# Patient Record
Sex: FEMALE | Race: BLACK OR AFRICAN AMERICAN
[De-identification: names, ages, dates, MRNs, and addresses within clinical notes are randomized per-mention and may not be internally consistent; named-entity substitution may affect disease eponyms.]

---

## 2021-09-27 ENCOUNTER — HOSPITAL ENCOUNTER (EMERGENCY)
Dept: HOSPITAL 76 - ED | Age: 21
Discharge: HOME | End: 2021-09-27
Payer: COMMERCIAL

## 2021-09-27 VITALS — SYSTOLIC BLOOD PRESSURE: 119 MMHG | DIASTOLIC BLOOD PRESSURE: 75 MMHG

## 2021-09-27 DIAGNOSIS — J02.9: ICD-10-CM

## 2021-09-27 DIAGNOSIS — B37.3: Primary | ICD-10-CM

## 2021-09-27 LAB
BV DNA PNL VAG NAA+PROBE: NEGATIVE
C GLABRATA DNA BLD QL NAA+PROBE: NEGATIVE
C KRUSEI DNA VAG QL NAA+PROBE: NEGATIVE
C TRACH DNA SPEC NAA+PROBE-ACNC: NEGATIVE
CANDIDA DNA VAG QL NAA+PROBE: POSITIVE
N GONORRHOEA DNA GENITAL QL NAA+PROBE: NEGATIVE
T VAGINALIS RRNA GENITAL QL PROBE: NEGATIVE
T VAGINALIS RRNA GENITAL QL PROBE: NEGATIVE

## 2021-09-27 PROCEDURE — 87491 CHLMYD TRACH DNA AMP PROBE: CPT

## 2021-09-27 PROCEDURE — 87591 N.GONORRHOEAE DNA AMP PROB: CPT

## 2021-09-27 PROCEDURE — 87801 DETECT AGNT MULT DNA AMPLI: CPT

## 2021-09-27 PROCEDURE — 87070 CULTURE OTHR SPECIMN AEROBIC: CPT

## 2021-09-27 PROCEDURE — 87481 CANDIDA DNA AMP PROBE: CPT

## 2021-09-27 PROCEDURE — 87661 TRICHOMONAS VAGINALIS AMPLIF: CPT

## 2021-09-27 PROCEDURE — 87430 STREP A AG IA: CPT

## 2021-09-27 PROCEDURE — 99283 EMERGENCY DEPT VISIT LOW MDM: CPT

## 2021-09-27 NOTE — ED PHYSICIAN DOCUMENTATION
PD HPI URI





- Stated complaint


Stated Complaint: FEMALE ,SWOLLEN NECK GLANDS





- Chief complaint


Chief Complaint: General





- History obtained from


History obtained from: Patient





PD PAST MEDICAL HISTORY





- Present Medications


Home Medications: 


                                Ambulatory Orders











 Medication  Instructions  Recorded  Confirmed


 


Fluconazole [Diflucan] 150 mg PO Q3D 6 Days #2 tablet 09/27/21 


 


Ibuprofen [Motrin] 600 mg PO TID PRN #20 tab 09/27/21 


 


cephALEXin [Keflex] 500 mg PO TID 6 Days #18 cap 09/27/21 














- Allergies


Allergies/Adverse Reactions: 


                                    Allergies











Allergy/AdvReac Type Severity Reaction Status Date / Time


 


No Known Drug Allergies Allergy   Verified 09/27/21 01:29














PD ED PE NORMAL





- Vitals


Vital signs reviewed: Yes





- General


General: Alert and oriented X 3, No acute distress, Well developed/nourished





- HEENT


HEENT: Ears normal.  No: Pharynx benign (left tonsils with some enlargement, 

faint exudate in crypts, with some peritonsillar redness/mild edema. No apparent

 abscess appearance. )





- Neck


Neck: Supple, no meningeal sign, Other (left anterior cervical with enlarged 

lymph node that is tender. )





- Cardiac


Cardiac: RRR





- Respiratory


Respiratory: Clear bilaterally





- Female 


Female : Chaperone present, Other (redness of inner labial. Vault with thicker

 white discharge. No endocervical discharge. )





- Derm


Derm: Normal color, Warm and dry, No rash





Results





- Vitals


Vitals: 


                                     Oxygen











O2 Source                      Room air

















- Labs


Labs: 


                                  Microbiology











 09/27/21 01:32 Group A Strep Throat Culture - Preliminary





 Throat    CULTURE IN PROGRESS. RESULTS TO FOLLOW.








                                Laboratory Tests











  09/27/21 09/27/21 09/27/21





  01:32 02:03 02:03


 


C. glabrata (PCR)    NEGATIVE


 


C. krusei (PCR)    NEGATIVE


 


Candida species DNA    POSITIVE A


 


Chlam trachomat DNA PCR   NEGATIVE 


 


N.gonorrhoeae DNA (PCR)   NEGATIVE 


 


Group A Strep Rapid  Negative  


 


T. vaginalis (PCR)   NEGATIVE  NEGATIVE


 


Bact Vaginosis (PCR)    NEGATIVE














PD MEDICAL DECISION MAKING





- ED course


Complexity details: reviewed results, considered differential (vaginal exam c/w 

yeast vaginitis. Obtained PCR tests as well. Her tonsils seem some enlarged on 

left with some redness/mucosal swelling (but not appearing peritonsillar abscess

 extent) with neck adenopathy. Seems likely bacterial. ), d/w patient





Departure





- Departure


Disposition: 01 Home, Self Care


Clinical Impression: 


 Yeast vaginitis





Acute pharyngitis


Qualifiers:


 Pharyngitis/tonsillitis etiology: unspecified etiology Qualified Code(s): J02.9

 - Acute pharyngitis, unspecified





Condition: Stable


Record reviewed to determine appropriate education?: Yes


Instructions:  ED Strep Pharyngitis Poss, ED Vaginal Infec Fungal Kim


Follow-Up: 


WALTER MORIN ARNP [Primary Care Provider] - 


Prescriptions: 


Fluconazole [Diflucan] 150 mg PO Q3D 6 Days #2 tablet


cephALEXin [Keflex] 500 mg PO TID 6 Days #18 cap


Ibuprofen [Motrin] 600 mg PO TID PRN #20 tab


 PRN Reason: Pain


Comments: 


Your rapid strep test is negative but your symptoms are certainly suspicious for

 strep pharyngitis.  We can start treating empirically pending the throat 

culture which will result in a couple of days.  If the culture were to come back

 negative, then the antibiotics could be stopped with presumption of a viral 

illness instead.





The vaginal exam seems most consistent with yeast vaginitis and we can treat 

with Diflucan antifungal tablet the dose here today and then every 3 days for 2 

more doses.  That should allow for clearing of that.





The vaginal PCR tests will result in a day or so and tell us if there is any 

bacterial vaginitis or S TI's.  If those are positive then we would need to add 

antibiotics for those and we would give you a call.





I would anticipate improvement over the next few days.  Stay well-hydrated.  

Ibuprofen 3 times a day with food for pain and inflammation as needed.  Add 

Tylenol if needed for further pain.





Recheck if not improving well over the next several days.





I transmitted your prescriptions to [facility] in [place]. 


Discharge Date/Time: 09/27/21 02:25

## 2021-12-27 ENCOUNTER — HOSPITAL ENCOUNTER (OUTPATIENT)
Dept: HOSPITAL 76 - LAB.N | Age: 21
End: 2021-12-27
Attending: PHYSICIAN ASSISTANT
Payer: COMMERCIAL

## 2021-12-27 DIAGNOSIS — U07.1: Primary | ICD-10-CM

## 2021-12-27 LAB
CLARITY UR REFRACT.AUTO: CLEAR
GLUCOSE UR QL STRIP.AUTO: NEGATIVE MG/DL
KETONES UR QL STRIP.AUTO: NEGATIVE MG/DL
NITRITE UR QL STRIP.AUTO: NEGATIVE
PH UR STRIP.AUTO: 6 PH (ref 5–7.5)
PROT UR STRIP.AUTO-MCNC: NEGATIVE MG/DL
RBC # UR STRIP.AUTO: NEGATIVE /UL
RBC # URNS HPF: (no result) /HPF (ref 0–5)
SP GR UR STRIP.AUTO: 1.01 (ref 1–1.03)
SQUAMOUS URNS QL MICRO: (no result)
UROBILINOGEN UR QL STRIP.AUTO: (no result) E.U./DL
UROBILINOGEN UR STRIP.AUTO-MCNC: NEGATIVE MG/DL
WBC # UR MANUAL: (no result) /HPF (ref 0–5)

## 2021-12-27 PROCEDURE — 87275 INFLUENZA B AG IF: CPT

## 2021-12-27 PROCEDURE — 87086 URINE CULTURE/COLONY COUNT: CPT

## 2021-12-27 PROCEDURE — 81001 URINALYSIS AUTO W/SCOPE: CPT

## 2021-12-27 PROCEDURE — 87276 INFLUENZA A AG IF: CPT

## 2022-04-24 ENCOUNTER — HOSPITAL ENCOUNTER (EMERGENCY)
Dept: HOSPITAL 76 - ED | Age: 22
Discharge: HOME | End: 2022-04-24
Payer: COMMERCIAL

## 2022-04-24 VITALS — SYSTOLIC BLOOD PRESSURE: 128 MMHG | DIASTOLIC BLOOD PRESSURE: 72 MMHG

## 2022-04-24 DIAGNOSIS — R10.2: Primary | ICD-10-CM

## 2022-04-24 DIAGNOSIS — Z97.5: ICD-10-CM

## 2022-04-24 LAB
ALBUMIN DIAFP-MCNC: 3.7 G/DL (ref 3.2–5.5)
ALBUMIN/GLOB SERPL: 1 {RATIO} (ref 1–2.2)
ALP SERPL-CCNC: 31 IU/L (ref 42–121)
ALT SERPL W P-5'-P-CCNC: 20 IU/L (ref 10–60)
ANION GAP SERPL CALCULATED.4IONS-SCNC: 7 MMOL/L (ref 6–13)
AST SERPL W P-5'-P-CCNC: 22 IU/L (ref 10–42)
BASOPHILS NFR BLD AUTO: 0 10^3/UL (ref 0–0.1)
BASOPHILS NFR BLD AUTO: 0.2 %
BILIRUB BLD-MCNC: 0.4 MG/DL (ref 0.2–1)
BUN SERPL-MCNC: 12 MG/DL (ref 6–20)
CALCIUM UR-MCNC: 8.9 MG/DL (ref 8.5–10.3)
CHLORIDE SERPL-SCNC: 106 MMOL/L (ref 101–111)
CLARITY UR REFRACT.AUTO: (no result)
CO2 SERPL-SCNC: 26 MMOL/L (ref 21–32)
CREAT SERPLBLD-SCNC: 0.7 MG/DL (ref 0.4–1)
EOSINOPHIL # BLD AUTO: 0 10^3/UL (ref 0–0.7)
EOSINOPHIL NFR BLD AUTO: 0.3 %
ERYTHROCYTE [DISTWIDTH] IN BLOOD BY AUTOMATED COUNT: 12.9 % (ref 12–15)
GFRSERPLBLD MDRD-ARVRAT: 126 ML/MIN/{1.73_M2} (ref 89–?)
GLOBULIN SER-MCNC: 3.6 G/DL (ref 2.1–4.2)
GLUCOSE SERPL-MCNC: 88 MG/DL (ref 70–100)
GLUCOSE UR QL STRIP.AUTO: NEGATIVE MG/DL
HCG UR QL: NEGATIVE
HCT VFR BLD AUTO: 35.2 % (ref 37–47)
HGB UR QL STRIP: 11.9 G/DL (ref 12–16)
KETONES UR QL STRIP.AUTO: NEGATIVE MG/DL
LIPASE SERPL-CCNC: 25 U/L (ref 22–51)
LYMPHOCYTES # SPEC AUTO: 2.6 10^3/UL (ref 1.5–3.5)
LYMPHOCYTES NFR BLD AUTO: 23.1 %
MCH RBC QN AUTO: 30.3 PG (ref 27–31)
MCHC RBC AUTO-ENTMCNC: 33.8 G/DL (ref 32–36)
MCV RBC AUTO: 89.6 FL (ref 81–99)
MONOCYTES # BLD AUTO: 1 10^3/UL (ref 0–1)
MONOCYTES NFR BLD AUTO: 8.6 %
NEUTROPHILS # BLD AUTO: 7.5 10^3/UL (ref 1.5–6.6)
NEUTROPHILS # SNV AUTO: 11.1 X10^3/UL (ref 4.8–10.8)
NEUTROPHILS NFR BLD AUTO: 67.5 %
NITRITE UR QL STRIP.AUTO: NEGATIVE
NRBC # BLD AUTO: 0 /100WBC
NRBC # BLD AUTO: 0 X10^3/UL
PDW BLD AUTO: 10.8 FL (ref 7.9–10.8)
PH UR STRIP.AUTO: 7 PH (ref 5–7.5)
PLATELET # BLD: 229 10^3/UL (ref 130–450)
POTASSIUM SERPL-SCNC: 3.6 MMOL/L (ref 3.5–5)
PROT SPEC-MCNC: 7.3 G/DL (ref 6.7–8.2)
PROT UR STRIP.AUTO-MCNC: NEGATIVE MG/DL
RBC # UR STRIP.AUTO: (no result) /UL
RBC # URNS HPF: (no result) /HPF (ref 0–5)
RBC MAR: 3.93 10^6/UL (ref 4.2–5.4)
SODIUM SERPLBLD-SCNC: 139 MMOL/L (ref 135–145)
SP GR UR STRIP.AUTO: 1.02 (ref 1–1.03)
SQUAMOUS URNS QL MICRO: (no result)
UROBILINOGEN UR QL STRIP.AUTO: (no result) E.U./DL
UROBILINOGEN UR STRIP.AUTO-MCNC: NEGATIVE MG/DL
WBC # UR MANUAL: (no result) /HPF (ref 0–5)

## 2022-04-24 PROCEDURE — 93975 VASCULAR STUDY: CPT

## 2022-04-24 PROCEDURE — 85025 COMPLETE CBC W/AUTO DIFF WBC: CPT

## 2022-04-24 PROCEDURE — 76830 TRANSVAGINAL US NON-OB: CPT

## 2022-04-24 PROCEDURE — 99284 EMERGENCY DEPT VISIT MOD MDM: CPT

## 2022-04-24 PROCEDURE — 36415 COLL VENOUS BLD VENIPUNCTURE: CPT

## 2022-04-24 PROCEDURE — 99282 EMERGENCY DEPT VISIT SF MDM: CPT

## 2022-04-24 PROCEDURE — 76856 US EXAM PELVIC COMPLETE: CPT

## 2022-04-24 PROCEDURE — 80053 COMPREHEN METABOLIC PANEL: CPT

## 2022-04-24 PROCEDURE — 81001 URINALYSIS AUTO W/SCOPE: CPT

## 2022-04-24 PROCEDURE — 83690 ASSAY OF LIPASE: CPT

## 2022-04-24 PROCEDURE — 81003 URINALYSIS AUTO W/O SCOPE: CPT

## 2022-04-24 PROCEDURE — 81025 URINE PREGNANCY TEST: CPT

## 2022-04-24 PROCEDURE — 87086 URINE CULTURE/COLONY COUNT: CPT

## 2022-04-24 RX ADMIN — HYDROCODONE BITARTRATE AND ACETAMINOPHEN STA TAB: 5; 325 TABLET ORAL at 18:44

## 2022-04-24 NOTE — ED PHYSICIAN DOCUMENTATION
PD HPI ABD PAIN





- Stated complaint


Stated Complaint: ABD PX





- Chief complaint


Chief Complaint: Abd Pain





- History obtained from


History obtained from: Patient (21-year-old woman presents with pelvic pain that

is severe x2 days.  She thinks it is related to a 1 month old IUD.  She denies 

bleeding or discharge.  No urinary complaints.)





Review of Systems


Constitutional: denies: Fever, Chills


Cardiac: denies: Chest pain / pressure


Respiratory: denies: Dyspnea, Cough


GI: denies: Nausea, Vomiting, Diarrhea





PD PAST MEDICAL HISTORY





- Past Medical History


Past Medical History: No





- Past Surgical History


Past Surgical History: Yes


HEENT: Tonsil/Adenoidectomy





- Present Medications


Home Medications: 


                                Ambulatory Orders











 Medication  Instructions  Recorded  Confirmed


 


Fluconazole [Diflucan] 150 mg PO Q3D 6 Days #2 tablet 09/27/21 


 


Ibuprofen [Motrin] 600 mg PO TID PRN #20 tab 09/27/21 


 


cephALEXin [Keflex] 500 mg PO TID 6 Days #18 cap 09/27/21 














- Allergies


Allergies/Adverse Reactions: 


                                    Allergies











Allergy/AdvReac Type Severity Reaction Status Date / Time


 


No Known Drug Allergies Allergy   Verified 04/24/22 16:47














- Social History


Does the pt smoke?: No


Smoking Status: Never smoker


Does the pt drink ETOH?: Yes


Does the pt have substance abuse?: No





- Immunizations


Immunizations are current?: Yes





PD ED PE NORMAL





- Vitals


Vital signs reviewed: Yes





- General


General: Alert and oriented X 3, No acute distress





- Abdomen


Abdomen: Normal bowel sounds, Soft, Non tender





- Female 


Female : Chaperone present (Cassie NEVILLE), Other (Appropriate placement of IUD 

strings, IUD removed with patient request.  No bimanual tenderness.)





- Neuro


Neuro: Alert and oriented X 3, Normal speech





Results





- Vitals


Vitals: 


                               Vital Signs - 24 hr











  04/24/22 04/24/22





  16:44 16:47


 


Temperature 36.4 C L 36.5 C


 


Heart Rate 67 67


 


Respiratory 14 14





Rate  


 


Blood Pressure 132/75 H 132/75 H


 


O2 Saturation 100 100








                                     Oxygen











O2 Source                      Room air

















- Labs


Labs: 


                                Laboratory Tests











  04/24/22 04/24/22 04/24/22





  17:46 17:46 18:10


 


WBC  11.1 H  


 


RBC  3.93 L  


 


Hgb  11.9 L  


 


Hct  35.2 L  


 


MCV  89.6  


 


MCH  30.3  


 


MCHC  33.8  


 


RDW  12.9  


 


Plt Count  229  


 


MPV  10.8  


 


Neut # (Auto)  7.5 H  


 


Lymph # (Auto)  2.6  


 


Mono # (Auto)  1.0  


 


Eos # (Auto)  0.0  


 


Baso # (Auto)  0.0  


 


Absolute Nucleated RBC  0.00  


 


Nucleated RBC %  0.0  


 


Sodium   139 


 


Potassium   3.6 


 


Chloride   106 


 


Carbon Dioxide   26 


 


Anion Gap   7.0 


 


BUN   12 


 


Creatinine   0.7 


 


Estimated GFR (MDRD)   126 


 


Glucose   88 


 


Calcium   8.9 


 


Total Bilirubin   0.4 


 


AST   22 


 


ALT   20 


 


Alkaline Phosphatase   31 L 


 


Total Protein   7.3 


 


Albumin   3.7 


 


Globulin   3.6 


 


Albumin/Globulin Ratio   1.0 


 


Lipase   25 


 


Urine Color    YELLOW


 


Urine Clarity    HAZY


 


Urine pH    7.0


 


Ur Specific Gravity    1.020


 


Urine Protein    NEGATIVE


 


Urine Glucose (UA)    NEGATIVE


 


Urine Ketones    NEGATIVE


 


Urine Occult Blood    SMALL H


 


Urine Nitrite    NEGATIVE


 


Urine Bilirubin    NEGATIVE


 


Urine Urobilinogen    0.2 (NORMAL)


 


Ur Leukocyte Esterase    NEGATIVE


 


Urine RBC    6-10 H


 


Urine WBC    0-3


 


Ur Squamous Epith Cells    NONE SEEN


 


Urine Bacteria    None Seen


 


Ur Microscopic Review    INDICATED


 


Urine Culture Comments    NOT INDICATED


 


Urine HCG, Qual   














  04/24/22





  18:10


 


WBC 


 


RBC 


 


Hgb 


 


Hct 


 


MCV 


 


MCH 


 


MCHC 


 


RDW 


 


Plt Count 


 


MPV 


 


Neut # (Auto) 


 


Lymph # (Auto) 


 


Mono # (Auto) 


 


Eos # (Auto) 


 


Baso # (Auto) 


 


Absolute Nucleated RBC 


 


Nucleated RBC % 


 


Sodium 


 


Potassium 


 


Chloride 


 


Carbon Dioxide 


 


Anion Gap 


 


BUN 


 


Creatinine 


 


Estimated GFR (MDRD) 


 


Glucose 


 


Calcium 


 


Total Bilirubin 


 


AST 


 


ALT 


 


Alkaline Phosphatase 


 


Total Protein 


 


Albumin 


 


Globulin 


 


Albumin/Globulin Ratio 


 


Lipase 


 


Urine Color 


 


Urine Clarity 


 


Urine pH 


 


Ur Specific Gravity 


 


Urine Protein 


 


Urine Glucose (UA) 


 


Urine Ketones 


 


Urine Occult Blood 


 


Urine Nitrite 


 


Urine Bilirubin 


 


Urine Urobilinogen 


 


Ur Leukocyte Esterase 


 


Urine RBC 


 


Urine WBC 


 


Ur Squamous Epith Cells 


 


Urine Bacteria 


 


Ur Microscopic Review 


 


Urine Culture Comments 


 


Urine HCG, Qual  NEGATIVE














PD MEDICAL DECISION MAKING





- ED course


ED course: 





21-year-old woman presents with pelvic pain that she feels is related to her 

IUD.  Alternative etiologies were not identified on testing.  Per her request 

the IUD was removed in standard fashion with nurse Anabella martinez at the bedside and

 chaperoning and she was pleased.





Pelvic ultrasound was unremarkable with normal placement of the IUD.





Departure





- Departure


Disposition: 01 Home, Self Care


Clinical Impression: 


 Pelvic pain in female





Condition: Good


Record reviewed to determine appropriate education?: Yes


Instructions:  ED Pelvic Pain [facility]


Comments: 


Your IUD was removed today, you will need to use alternative forms of birth 

control.  Follow-up with your primary care or gynecologist, next available 

appointment.  Return for new or worsening symptoms.

## 2022-04-24 NOTE — ULTRASOUND REPORT
PROCEDURE:  Pelvic w/Transvag+Doppler Comp

 

INDICATIONS:  pelvic pain

 

TECHNIQUE:  

Real-time scanning was performed of the pelvic organs, with image documentation.  Additional endovagi
nal scanning was necessary due to incomplete visualization of the adnexal and endometrial structures 
by transabdominal scanning.  Doppler interrogation was performed of the ovaries bilaterally.

 

COMPARISON:  None.

 

FINDINGS:  

No pathologic free abdominal or pelvic fluid.  

 

Uterus:  Uterus is normal in size at 7.4 x 3.1 x 4.5 cm cm.  The endometrium measures 4.2 mm in combi
giorgio thickness.  IUD is in good position.

 

Ovaries:  Both ovaries have a normal size. Normal appearing arterial and venous waveforms are confirm
ed to each ovary.]

 

 

 

IMPRESSION: 

1. IUD is in good position.

2. No acute abnormality.

 

Reviewed by: Saran Boyle on 4/24/2022 7:35 PM PDT

Approved by: Saran Boyle on 4/24/2022 7:35 PM PDT

 

 

Station ID:  [id number]

## 2022-06-30 ENCOUNTER — HOSPITAL ENCOUNTER (EMERGENCY)
Dept: HOSPITAL 76 - ED | Age: 22
Discharge: HOME | End: 2022-06-30
Payer: COMMERCIAL

## 2022-06-30 VITALS — DIASTOLIC BLOOD PRESSURE: 82 MMHG | SYSTOLIC BLOOD PRESSURE: 123 MMHG

## 2022-06-30 DIAGNOSIS — S93.501A: Primary | ICD-10-CM
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
## 2022-06-30 PROCEDURE — 99283 EMERGENCY DEPT VISIT LOW MDM: CPT

## 2022-06-30 PROCEDURE — 73660 X-RAY EXAM OF TOE(S): CPT

## 2022-06-30 PROCEDURE — 99282 EMERGENCY DEPT VISIT SF MDM: CPT

## 2022-06-30 NOTE — ED PHYSICIAN DOCUMENTATION
PD HPI LOWER EXT INJURY





- Stated complaint


Stated Complaint: R TOE INJ





- Chief complaint


Chief Complaint: Ext Problem





- Additional information


Additional information: 


Patient is 21-year-old female presenting to the emergency department with right 

great toe pain.  Was doing reverse wall walks at the gym, flipped over backwards

and landed on her toe in an awkward fashion.  States that initially she was able

to ambulate however pain got progressively worse approximately 90 minutes after 

the incident.  Denies previous orthopedic injuries to the same foot.








Review of Systems


Ten Systems: 10 systems reviewed and negative


Constitutional: denies: Fever


Cardiac: denies: Chest pain / pressure


Respiratory: denies: Dyspnea


GI: denies: Abdominal Pain





PD PAST MEDICAL HISTORY





- Past Surgical History


Past Surgical History: Yes


HEENT: Tonsil/Adenoidectomy





- Present Medications


Home Medications: 


                                Ambulatory Orders











 Medication  Instructions  Recorded  Confirmed


 


Fluconazole [Diflucan] 150 mg PO Q3D 6 Days #2 tablet 09/27/21 


 


Ibuprofen [Motrin] 600 mg PO TID PRN #20 tab 09/27/21 


 


cephALEXin [Keflex] 500 mg PO TID 6 Days #18 cap 09/27/21 














- Allergies


Allergies/Adverse Reactions: 


                                    Allergies











Allergy/AdvReac Type Severity Reaction Status Date / Time


 


No Known Drug Allergies Allergy   Verified 06/30/22 20:40














- Social History


Does the pt smoke?: No


Smoking Status: Never smoker


Does the pt drink ETOH?: Yes


Does the pt have substance abuse?: No





- Immunizations


Immunizations are current?: Yes





PD ED PE NORMAL





- General


General: Alert and oriented X 3





- HEENT


HEENT: Atraumatic





- Respiratory


Respiratory: No respiratory distress





- Female 


Female : Deferred





- Rectal


Rectal: Deferred





- Extremities


Extremities: Other (There is ecchymosis on the dorsal aspect of the right great 

toe.  Normal sensation distal to site of injury.  Normal capillary refill.  

There is no pain at the navicular bone, base of the fifth metatarsal or at 

either the lateral or medial malleoli.)





Results





- Vitals


Vitals: 





                               Vital Signs - 24 hr











  06/30/22





  20:36


 


Temperature 36.5 C


 


Heart Rate 75


 


Respiratory 14





Rate 


 


Blood Pressure 123/82 H


 


O2 Saturation 100








                                     Oxygen











O2 Source                      Room air

















PD MEDICAL DECISION MAKING





- ED course


Complexity details: d/w patient


ED course: 


Patient is 21-year-old female presenting to the emergency department with right 

great toe injury.  X-rays negative for acute fracture.  Neurovascularly intact. 

 Will discharge with hard soled shoe, crutches.  Given Motrin here in the 

emergency department and instructions for alternating Motrin and Tylenol at 

home.  Encouraged ice packs and elevation as well.  Encouraged follow-up with 

primary care and or return to the emergency department for persistent or 

worsening symptoms.








Departure





- Departure


Clinical Impression: 


 Toe sprain





Instructions:  ED Sprain Foot

## 2022-06-30 NOTE — XRAY REPORT
PROCEDURE: Toe(s) RT 

 

INDICATIONS:  Toe pain, s/p fall

 

TECHNIQUE:   AP view of the foot and 2 additional views of the great toe acquired.

 

COMPARISON:  6/23/2022

 

FINDINGS:  

 

Bones:  No fractures or dislocations.  No suspicious bony lesions.  

 

Soft tissues:  No suspicious soft tissue densities.  

 

IMPRESSION:  

 

1. No fracture or dislocation.

 

Reviewed by: Manan Daniel MD on 6/30/2022 9:48 PM PDT

Approved by: Manan Daniel MD on 6/30/2022 9:48 PM PDT

 

 

Station ID:  [id number]

## 2023-02-23 ENCOUNTER — HOSPITAL ENCOUNTER (EMERGENCY)
Dept: HOSPITAL 76 - ED | Age: 23
Discharge: HOME | End: 2023-02-23
Payer: COMMERCIAL

## 2023-02-23 VITALS — SYSTOLIC BLOOD PRESSURE: 113 MMHG | DIASTOLIC BLOOD PRESSURE: 81 MMHG

## 2023-02-23 DIAGNOSIS — R10.30: Primary | ICD-10-CM

## 2023-02-23 LAB
ALBUMIN DIAFP-MCNC: 3.8 G/DL (ref 3.2–5.5)
ALBUMIN/GLOB SERPL: 0.9 {RATIO} (ref 1–2.2)
ALP SERPL-CCNC: 38 IU/L (ref 42–121)
ALT SERPL W P-5'-P-CCNC: 15 IU/L (ref 10–60)
ANION GAP SERPL CALCULATED.4IONS-SCNC: 9 MMOL/L (ref 6–13)
AST SERPL W P-5'-P-CCNC: 24 IU/L (ref 10–42)
BASOPHILS NFR BLD AUTO: 0 10^3/UL (ref 0–0.1)
BASOPHILS NFR BLD AUTO: 0.1 %
BILIRUB BLD-MCNC: 0.5 MG/DL (ref 0.2–1)
BUN SERPL-MCNC: 10 MG/DL (ref 6–20)
CALCIUM UR-MCNC: 9.2 MG/DL (ref 8.5–10.3)
CHLORIDE SERPL-SCNC: 102 MMOL/L (ref 101–111)
CLARITY UR REFRACT.AUTO: CLEAR
CO2 SERPL-SCNC: 23 MMOL/L (ref 21–32)
CREAT SERPLBLD-SCNC: 0.7 MG/DL (ref 0.4–1)
EOSINOPHIL # BLD AUTO: 0 10^3/UL (ref 0–0.7)
EOSINOPHIL NFR BLD AUTO: 0.3 %
ERYTHROCYTE [DISTWIDTH] IN BLOOD BY AUTOMATED COUNT: 13 % (ref 12–15)
GFRSERPLBLD MDRD-ARVRAT: 125 ML/MIN/{1.73_M2} (ref 89–?)
GLOBULIN SER-MCNC: 4.1 G/DL (ref 2.1–4.2)
GLUCOSE SERPL-MCNC: 109 MG/DL (ref 70–100)
GLUCOSE UR QL STRIP.AUTO: NEGATIVE MG/DL
HCG UR QL: NEGATIVE
HCT VFR BLD AUTO: 36.1 % (ref 37–47)
HGB UR QL STRIP: 12.3 G/DL (ref 12–16)
KETONES UR QL STRIP.AUTO: NEGATIVE MG/DL
LIPASE SERPL-CCNC: 28 U/L (ref 22–51)
LYMPHOCYTES # SPEC AUTO: 2.5 10^3/UL (ref 1.5–3.5)
LYMPHOCYTES NFR BLD AUTO: 28.5 %
MCH RBC QN AUTO: 29.7 PG (ref 27–31)
MCHC RBC AUTO-ENTMCNC: 34.1 G/DL (ref 32–36)
MCV RBC AUTO: 87.2 FL (ref 81–99)
MONOCYTES # BLD AUTO: 0.8 10^3/UL (ref 0–1)
MONOCYTES NFR BLD AUTO: 8.9 %
NEUTROPHILS # BLD AUTO: 5.4 10^3/UL (ref 1.5–6.6)
NEUTROPHILS # SNV AUTO: 8.7 X10^3/UL (ref 4.8–10.8)
NEUTROPHILS NFR BLD AUTO: 62.1 %
NITRITE UR QL STRIP.AUTO: NEGATIVE
NRBC # BLD AUTO: 0 /100WBC
NRBC # BLD AUTO: 0 X10^3/UL
PDW BLD AUTO: 10.5 FL (ref 7.9–10.8)
PH UR STRIP.AUTO: 6 PH (ref 5–7.5)
PLATELET # BLD: 264 10^3/UL (ref 130–450)
POTASSIUM SERPL-SCNC: 3.5 MMOL/L (ref 3.5–5)
PROT SPEC-MCNC: 7.9 G/DL (ref 6.7–8.2)
PROT UR STRIP.AUTO-MCNC: NEGATIVE MG/DL
RBC # UR STRIP.AUTO: (no result) /UL
RBC # URNS HPF: (no result) /HPF (ref 0–5)
RBC MAR: 4.14 10^6/UL (ref 4.2–5.4)
SODIUM SERPLBLD-SCNC: 134 MMOL/L (ref 135–145)
SP GR UR STRIP.AUTO: 1.02 (ref 1–1.03)
SQUAMOUS URNS QL MICRO: (no result)
UROBILINOGEN UR QL STRIP.AUTO: (no result) E.U./DL
UROBILINOGEN UR STRIP.AUTO-MCNC: NEGATIVE MG/DL
WBC # UR MANUAL: (no result) /HPF (ref 0–5)

## 2023-02-23 PROCEDURE — 87661 TRICHOMONAS VAGINALIS AMPLIF: CPT

## 2023-02-23 PROCEDURE — 74177 CT ABD & PELVIS W/CONTRAST: CPT

## 2023-02-23 PROCEDURE — 80053 COMPREHEN METABOLIC PANEL: CPT

## 2023-02-23 PROCEDURE — 36415 COLL VENOUS BLD VENIPUNCTURE: CPT

## 2023-02-23 PROCEDURE — 81025 URINE PREGNANCY TEST: CPT

## 2023-02-23 PROCEDURE — 81001 URINALYSIS AUTO W/SCOPE: CPT

## 2023-02-23 PROCEDURE — 87591 N.GONORRHOEAE DNA AMP PROB: CPT

## 2023-02-23 PROCEDURE — 81003 URINALYSIS AUTO W/O SCOPE: CPT

## 2023-02-23 PROCEDURE — 99284 EMERGENCY DEPT VISIT MOD MDM: CPT

## 2023-02-23 PROCEDURE — 83690 ASSAY OF LIPASE: CPT

## 2023-02-23 PROCEDURE — 96374 THER/PROPH/DIAG INJ IV PUSH: CPT

## 2023-02-23 PROCEDURE — 87086 URINE CULTURE/COLONY COUNT: CPT

## 2023-02-23 PROCEDURE — 85025 COMPLETE CBC W/AUTO DIFF WBC: CPT

## 2023-02-23 PROCEDURE — 87491 CHLMYD TRACH DNA AMP PROBE: CPT

## 2023-02-23 NOTE — EXTERNAL MEDICAL SUMMARY RPT
Continuity of Care Document

                          Created on:2023



Patient:Sean Sung

Sex:Female

:2000

External Reference #:[id number]





Demographics







                          Address                   [address] [place] [address]

 

                          Phone                     Unavailable

 

                          Preferred Language        English

 

                          Marital Status            Never 

 

                          Mosque Affiliation     Unknown

 

                          Race                      Unknown

 

                          Ethnic Group              Not  or 









Author







                          Organization              [facility]

 

                          [place] [address]

 

                          Phone                     [phone]









Care Team Providers







                    Name                Role                Phone

 

                    Paz Garcia     Unavailable         Unavailable









Allergies and Intolerances







                 date            description     facility        type

 

                 (no date)       No Known Drug Allergies  [facility]  (unkn

own)







Encounters

No information.



Functional Status

No information.



Immunizations

No information.



Medications

No information.



Problems







                     date                description         facility

 

                     2023 00:00    Abdominal pain      [facility]







Procedures

No information.



Results/Labs







           test      date      author    facility  value     unit      interpret

ation









                                         Result panel 1









           (unknown)  (no       (unknown)  (unknown)  (no value)  (units    (unk

nown)



                    date)                                   unknown)  

 

           (unknown)  (no       (unknown)  (unknown)  23  (units    (unkno

wn)



                    date)                                   unknown)  

 

           (unknown)  (no       (unknown)  (unknown)  04:20     (units    (unkno

wn)



                    date)                                   unknown)  

 

           (unknown)  (no       (unknown)  (unknown)  [id number]     (units    (unkno

wn)



                    date)                                   unknown)  

 

           (unknown)  (no       (unknown)  (unknown)  Age/Sex: 22 / F  (units   

 (unknown)



                    date)                                   unknown)  

 

           (unknown)  (no       (unknown)  (unknown)  Allergies  (units    (unkn

own)



                    date)                                   unknown)  

 

           (unknown)  (no       (unknown)  (unknown)  Allergy/AdvReac  (units   

 (unknown)



                    date)                         Type Severity unknown)  



                                                  Reaction Status           



                                                  Date / Time           

 

           (unknown)  (no       (unknown)  (unknown)  Bedside Urine  (units    (

unknown)



                    date)                         Bilirubin - unknown)  



                                                  Negative            

 

           (unknown)  (no       (unknown)  (unknown)  Bedside Urine  (units    (

unknown)



                    date)                         Glucose Negative unknown)  

 

           (unknown)  (no       (unknown)  (unknown)  Bedside Urine  (units    (

unknown)



                    date)                         Ketone -  unknown)  



                                                  Negative            

 

           (unknown)  (no       (unknown)  (unknown)  Bedside Urine  (units    (

unknown)



                    date)                         Leukocytes - unknown)  



                                                  Negative            

 

           (unknown)  (no       (unknown)  (unknown)  Bedside Urine  (units    (

unknown)



                    date)                         Nitrite - unknown)  



                                                  Negative            

 

           (unknown)  (no       (unknown)  (unknown)  Bedside Urine  (units    (

unknown)



                    date)                         Occult Blood unknown)  

 

           (unknown)  (no       (unknown)  (unknown)  Bedside Urine  (units    (

unknown)



                    date)                         Protein - unknown)  



                                                  Negative            

 

           (unknown)  (no       (unknown)  (unknown)  Bedside Urine  (units    (

unknown)



                    date)                         Urobilinogen - unknown)  



                                                  Negative            

 

           (unknown)  (no       (unknown)  (unknown)  Bedside Urine  (units    (

unknown)



                    date)                         pH 6.0    unknown)  

 

           (unknown)  (no       (unknown)  (unknown)  Blood Pressure  (units    

(unknown)



                    date)                         121/77 23 unknown)  



                                                  04:20               

 

           (unknown)  (no       (unknown)  (unknown)  Blood Pressure  (units    

(unknown)



                    date)                             unknown)  

 

           (unknown)  (no       (unknown)  (unknown)  Chief     (units    (unkno

wn)



                    date)                         Complaint: unknown)  



                                                  Abdominal Pain           

 

           (unknown)  (no       (unknown)  (unknown)  Course    (units    (unkno

wn)



                    date)                                   unknown)  

 

           (unknown)  (no       (unknown)  (unknown)  : 2000  (units   

 (unknown)



                    date)                         Acct:[id number] unknown)  

 

           (unknown)  (no       (unknown)  (unknown)  Date of   (units    (unkno

wn)



                    date)                         Service:  unknown)  



                                                  23            

 

           (unknown)  (no       (unknown)  (unknown)  Departure  (units    (unkn

own)



                    date)                                   unknown)  

 

           (unknown)  (no       (unknown)  (unknown)  Discharge Plan  (units    

(unknown)



                    date)                                   unknown)  

 

           (unknown)  (no       (unknown)  (unknown)  ER Physician:  (units    (

unknown)



                    date)                         Paz Garcia unknown)  



                                                  D.O.                

 

           (unknown)  (no       (unknown)  (unknown)  Emergency  (units    (unkn

own)



                    date)                         Report    unknown)  

 

           (unknown)  (no       (unknown)  (unknown)  Esterase  (units    (unkno

wn)



                    date)                                   unknown)  

 

           (unknown)  (no       (unknown)  (unknown)  Exam      (units    (unkno

wn)



                    date)                                   unknown)  

 

           (unknown)  (no       (unknown)  (unknown)  General   (units    (unkno

wn)



                    date)                                   unknown)  

 

           (unknown)  (no       (unknown)  (unknown)  HPI - Abdominal  (units   

 (unknown)



                    date)                         Pain      unknown)  

 

           (unknown)  (no       (unknown)  (unknown)  Initial Vital  (units    (

unknown)



                    date)                         Signs     unknown)  

 

           (unknown)  (no       (unknown)  (unknown)  Initial Vital  (units    (

unknown)



                    date)                         Signs:    unknown)  

 

           (unknown)  (no       (unknown)  (unknown)  [facility]  (units   

 (unknown)



                    date)                         [address] unknown)  



                                                  [place], [place]           



                                                  [address]               

 

           (unknown)  (no       (unknown)  (unknown)  Lab Data  (units    (unkno

wn)



                    date)                                   unknown)  

 

           (unknown)  (no       (unknown)  (unknown)  MDM - Abdominal  (units   

 (unknown)



                    date)                         Pain      unknown)  

 

           (unknown)  (no       (unknown)  (unknown)  Mode of   (units    (unkno

wn)



                    date)                         arrival:  unknown)  



                                                  Ambulatory           

 

           (unknown)  (no       (unknown)  (unknown)  No Known Drug  (units    (

unknown)



                    date)                         Allergies unknown)  



                                                  Allergy Verified           



                                                  23 04:35           

 

           (unknown)  (no       (unknown)  (unknown)  Oxygen Delivery  (units   

 (unknown)



                    date)                         Method 23 unknown)  



                                                  04:20               

 

           (unknown)  (no       (unknown)  (unknown)  Oxygen Delivery  (units   

 (unknown)



                    date)                         Method Room Air unknown)  

 

           (unknown)  (no       (unknown)  (unknown)  Patient History  (units   

 (unknown)



                    date)                                   unknown)  

 

           (unknown)  (no       (unknown)  (unknown)  Patient:  (units    (unkno

wn)



                    date)                         Sean Sung unknown)  



                                                  MR#:            

 

           (unknown)  (no       (unknown)  (unknown)  Point of Care  (units    (

unknown)



                    date)                         Testing   unknown)  

 

           (unknown)  (no       (unknown)  (unknown)  Point of care  (units    (

unknown)



                    date)                         testing:  unknown)  

 

           (unknown)  (no       (unknown)  (unknown)  Pregnancy Test  (units    

(unknown)



                    date)                         Results Negative unknown)  

 

           (unknown)  (no       (unknown)  (unknown)  Provider,Lucila  (units   

 (unknown)



                    date)                         y PRAKASH [Primary unknown)  



                                                  Care Provider]           

 

           (unknown)  (no       (unknown)  (unknown)  Pulse Oximetry  (units    

(unknown)



                    date)                         100 23 unknown)  



                                                  04:20               

 

           (unknown)  (no       (unknown)  (unknown)  Pulse Oximetry  (units    

(unknown)



                    date)                         100       unknown)  

 

           (unknown)  (no       (unknown)  (unknown)  Pulse Rate 68  (units    (

unknown)



                    date)                         23 04:20 unknown)  

 

           (unknown)  (no       (unknown)  (unknown)  Pulse Rate 68  (units    (

unknown)



                    date)                                   unknown)  

 

           (unknown)  (no       (unknown)  (unknown)  Referrals:  (units    (unk

nown)



                    date)                                   unknown)  

 

           (unknown)  (no       (unknown)  (unknown)  Related Data  (units    (u

nknown)



                    date)                                   unknown)  

 

           (unknown)  (no       (unknown)  (unknown)  Respiratory  (units    (un

known)



                    date)                         Rate 18 23 unknown)  



                                                  04:20               

 

           (unknown)  (no       (unknown)  (unknown)  Respiratory  (units    (un

known)



                    date)                         Rate 18   unknown)  

 

           (unknown)  (no       (unknown)  (unknown)  Signed By:  (units    (unk

nown)



                    date)                                   unknown)  

 

           (unknown)  (no       (unknown)  (unknown)  Smoking Status:  (units   

 (unknown)



                    date)                         Never smoker unknown)  

 

           (unknown)  (no       (unknown)  (unknown)  Social History  (units    

(unknown)



                    date)                                   unknown)  

 

           (unknown)  (no       (unknown)  (unknown)  Source: patient  (units   

 (unknown)



                    date)                                   unknown)  

 

           (unknown)  (no       (unknown)  (unknown)  Stated    (units    (unkno

wn)



                    date)                         Complaint: ABD unknown)  



                                                  PAIN                

 

           (unknown)  (no       (unknown)  (unknown)  Substance Use  (units    (

unknown)



                    date)                         Type: does not unknown)  



                                                  use                 

 

           (unknown)  (no       (unknown)  (unknown)  Temperature  (units    (un

known)



                    date)                         98.7 F 23 unknown)  



                                                  04:20               

 

           (unknown)  (no       (unknown)  (unknown)  Temperature  (units    (un

known)



                    date)                         98.7 F    unknown)  

 

           (unknown)  (no       (unknown)  (unknown)  Time Seen by  (units    (u

nknown)



                    date)                         Provider: unknown)  



                                                  23 05:06           

 

           (unknown)  (no       (unknown)  (unknown)  Urine Dip  (units    (unkn

own)



                    date)                                   unknown)  

 

           (unknown)  (no       (unknown)  (unknown)  Urine Specific  (units    

(unknown)



                    date)                         Gravity 1.020 unknown)  

 

           (unknown)  (no       (unknown)  (unknown)  Vital Signs - 8  (units   

 (unknown)



                    date)                         hr        unknown)  

 

           (unknown)  (no       (unknown)  (unknown)  Vital Signs  (units    (un

known)



                    date)                                   unknown)  

 

           (unknown)  (no       (unknown)  (unknown)  Vital signs:  (units    (u

nknown)



                    date)                                   unknown)  

 

           (unknown)  (no       (unknown)  (unknown)  alcohol intake  (units    

(unknown)



                    date)                         frequency: a few unknown)  



                                                  times a month           









                                         Result panel 2









           (unknown)  (no       (unknown)  (unknown)  (no value)  (units    (unk

nown)



                    date)                                   unknown)  

 

           (unknown)  (no       (unknown)  (unknown)  23  (units    (unkno

wn)



                    date)                                   unknown)  

 

           (unknown)  (no       (unknown)  (unknown)  04:20     (units    (unkno

wn)



                    date)                                   unknown)  

 

           (unknown)  (no       (unknown)  (unknown)  [address]     (units    (unkno

wn)



                    date)                                   unknown)  

 

           (unknown)  (no       (unknown)  (unknown)  Age/Sex: 22 / F  (units   

 (unknown)



                    date)                                   unknown)  

 

           (unknown)  (no       (unknown)  (unknown)  Allergies  (units    (unkn

own)



                    date)                                   unknown)  

 

           (unknown)  (no       (unknown)  (unknown)  Allergy/AdvReac  (units   

 (unknown)



                    date)                         Type Severity unknown)  



                                                  Reaction Status           



                                                  Date / Time           

 

           (unknown)  (no       (unknown)  (unknown)  Bedside Urine  (units    (

unknown)



                    date)                         Bilirubin - unknown)  



                                                  Negative            

 

           (unknown)  (no       (unknown)  (unknown)  Bedside Urine  (units    (

unknown)



                    date)                         Glucose Negative unknown)  

 

           (unknown)  (no       (unknown)  (unknown)  Bedside Urine  (units    (

unknown)



                    date)                         Ketone -  unknown)  



                                                  Negative            

 

           (unknown)  (no       (unknown)  (unknown)  Bedside Urine  (units    (

unknown)



                    date)                         Leukocytes - unknown)  



                                                  Negative            

 

           (unknown)  (no       (unknown)  (unknown)  Bedside Urine  (units    (

unknown)



                    date)                         Nitrite - unknown)  



                                                  Negative            

 

           (unknown)  (no       (unknown)  (unknown)  Bedside Urine  (units    (

unknown)



                    date)                         Occult Blood unknown)  

 

           (unknown)  (no       (unknown)  (unknown)  Bedside Urine  (units    (

unknown)



                    date)                         Protein - unknown)  



                                                  Negative            

 

           (unknown)  (no       (unknown)  (unknown)  Bedside Urine  (units    (

unknown)



                    date)                         Urobilinogen - unknown)  



                                                  Negative            

 

           (unknown)  (no       (unknown)  (unknown)  Bedside Urine  (units    (

unknown)



                    date)                         pH 6.0    unknown)  

 

           (unknown)  (no       (unknown)  (unknown)  Blood Pressure  (units    

(unknown)



                    date)                         121/77 23 unknown)  



                                                  04:20               

 

           (unknown)  (no       (unknown)  (unknown)  Blood Pressure  (units    

(unknown)



                    date)                             unknown)  

 

           (unknown)  (no       (unknown)  (unknown)  Chief     (units    (unkno

wn)



                    date)                         Complaint: unknown)  



                                                  Abdominal Pain           

 

           (unknown)  (no       (unknown)  (unknown)  Course    (units    (unkno

wn)



                    date)                                   unknown)  

 

           (unknown)  (no       (unknown)  (unknown)  : 2000  (units   

 (unknown)



                    date)                         Acct:[id number] unknown)  

 

           (unknown)  (no       (unknown)  (unknown)  Date of   (units    (unkno

wn)



                    date)                         Service:  unknown)  



                                                  23            

 

           (unknown)  (no       (unknown)  (unknown)  Departure  (units    (unkn

own)



                    date)                                   unknown)  

 

           (unknown)  (no       (unknown)  (unknown)  Discharge Plan  (units    

(unknown)



                    date)                                   unknown)  

 

           (unknown)  (no       (unknown)  (unknown)  ER Physician:  (units    (

unknown)



                    date)                         Paz Garcia unknown)  



                                                  D.O.                

 

           (unknown)  (no       (unknown)  (unknown)  Emergency  (units    (unkn

own)



                    date)                         Report    unknown)  

 

           (unknown)  (no       (unknown)  (unknown)  Esterase  (units    (unkno

wn)



                    date)                                   unknown)  

 

           (unknown)  (no       (unknown)  (unknown)  Exam      (units    (unkno

wn)



                    date)                                   unknown)  

 

           (unknown)  (no       (unknown)  (unknown)  General   (units    (unkno

wn)



                    date)                                   unknown)  

 

           (unknown)  (no       (unknown)  (unknown)  HPI - Abdominal  (units   

 (unknown)



                    date)                         Pain      unknown)  

 

           (unknown)  (no       (unknown)  (unknown)  HPI narrative:  (units    

(unknown)



                    date)                                   unknown)  

 

           (unknown)  (no       (unknown)  (unknown)  History of  (units    (unk

nown)



                    date)                         Present Illness unknown)  

 

           (unknown)  (no       (unknown)  (unknown)  Initial Vital  (units    (

unknown)



                    date)                         Signs     unknown)  

 

           (unknown)  (no       (unknown)  (unknown)  Initial Vital  (units    (

unknown)



                    date)                         Signs:    unknown)  

 

           (unknown)  (no       (unknown)  (unknown)  [facility]  (units   

 (unknown)



                    date)                         [address] unknown)  



                                                  [place], [place]           



                                                  [address]               

 

           (unknown)  (no       (unknown)  (unknown)  Lab Data  (units    (unkno

wn)



                    date)                                   unknown)  

 

           (unknown)  (no       (unknown)  (unknown)  MDM - Abdominal  (units   

 (unknown)



                    date)                         Pain      unknown)  

 

           (unknown)  (no       (unknown)  (unknown)  Mode of   (units    (unkno

wn)



                    date)                         arrival:  unknown)  



                                                  Ambulatory           

 

           (unknown)  (no       (unknown)  (unknown)  No Known Drug  (units    (

unknown)



                    date)                         Allergies unknown)  



                                                  Allergy Verified           



                                                  23 04:35           

 

           (unknown)  (no       (unknown)  (unknown)  Oxygen Delivery  (units   

 (unknown)



                    date)                         Method 23 unknown)  



                                                  04:20               

 

           (unknown)  (no       (unknown)  (unknown)  Oxygen Delivery  (units   

 (unknown)



                    date)                         Method Room Air unknown)  

 

           (unknown)  (no       (unknown)  (unknown)  Patient History  (units   

 (unknown)



                    date)                                   unknown)  

 

           (unknown)  (no       (unknown)  (unknown)  Patient is a  (units    (u

nknown)



                    date)                         healthy   unknown)  



                                                  22-year-old           



                                                  female who woke           



                                                  up at 2:45 a.m.           



                                                  within can           

 

           (unknown)  (no       (unknown)  (unknown)  Patient:  (units    (unkno

wn)



                    date)                         Sean Sung unknown)  



                                                  MR#:            

 

           (unknown)  (no       (unknown)  (unknown)  Point of Care  (units    (

unknown)



                    date)                         Testing   unknown)  

 

           (unknown)  (no       (unknown)  (unknown)  Point of care  (units    (

unknown)



                    date)                         testing:  unknown)  

 

           (unknown)  (no       (unknown)  (unknown)  Pregnancy Test  (units    

(unknown)



                    date)                         Results Negative unknown)  

 

           (unknown)  (no       (unknown)  (unknown)  Provider,Lucila  (units   

 (unknown)



                    date)                         y PRAKASH [Primary unknown)  



                                                  Care Provider]           

 

           (unknown)  (no       (unknown)  (unknown)  Pulse Oximetry  (units    

(unknown)



                    date)                         100 23 unknown)  



                                                  04:20               

 

           (unknown)  (no       (unknown)  (unknown)  Pulse Oximetry  (units    

(unknown)



                    date)                         100       unknown)  

 

           (unknown)  (no       (unknown)  (unknown)  Pulse Rate 68  (units    (

unknown)



                    date)                         23 04:20 unknown)  

 

           (unknown)  (no       (unknown)  (unknown)  Pulse Rate 68  (units    (

unknown)



                    date)                                   unknown)  

 

           (unknown)  (no       (unknown)  (unknown) ROS        (units    (unkno

wn)



                    date)                         Unobtainable: unknown)  



                                                  All systems           



                                                  reviewed + are           



                                                  unremarkable           



                                                  except as noted           



                                                  in HPI              

 

           (unknown)  (no       (unknown)  (unknown)  Referrals:  (units    (unk

nown)



                    date)                                   unknown)  

 

           (unknown)  (no       (unknown)  (unknown)  Related Data  (units    (u

nknown)



                    date)                                   unknown)  

 

           (unknown)  (no       (unknown)  (unknown)  Respiratory  (units    (un

known)



                    date)                         Rate 18 23 unknown)  



                                                  04:20               

 

           (unknown)  (no       (unknown)  (unknown)  Respiratory  (units    (un

known)



                    date)                         Rate 18   unknown)  

 

           (unknown)  (no       (unknown)  (unknown)  Review of  (units    (unkn

own)



                    date)                         Systems   unknown)  

 

           (unknown)  (no       (unknown)  (unknown)  Signed By:  (units    (unk

nown)



                    date)                                   unknown)  

 

           (unknown)  (no       (unknown)  (unknown)  Smoking Status:  (units   

 (unknown)



                    date)                         Never smoker unknown)  

 

           (unknown)  (no       (unknown)  (unknown)  Social History  (units    

(unknown)



                    date)                         (Reviewed unknown)  



                                                  23 @ 05:19           



                                                  by Paz Garcia DO)           

 

           (unknown)  (no       (unknown)  (unknown)  Source: patient  (units   

 (unknown)



                    date)                                   unknown)  

 

           (unknown)  (no       (unknown)  (unknown)  Stated    (units    (unkno

wn)



                    date)                         Complaint: ABD unknown)  



                                                  PAIN                

 

           (unknown)  (no       (unknown)  (unknown)  Substance Use  (units    (

unknown)



                    date)                         Type: does not unknown)  



                                                  use                 

 

           (unknown)  (no       (unknown)  (unknown)  Temperature  (units    (un

known)



                    date)                         98.7 F 23 unknown)  



                                                  04:20               

 

           (unknown)  (no       (unknown)  (unknown)  Temperature  (units    (un

known)



                    date)                         98.7 F    unknown)  

 

           (unknown)  (no       (unknown)  (unknown)  Time Seen by  (units    (u

nknown)



                    date)                         Provider: unknown)  



                                                  23 05:06           

 

           (unknown)  (no       (unknown)  (unknown)  Urine Dip  (units    (unkn

own)



                    date)                                   unknown)  

 

           (unknown)  (no       (unknown)  (unknown)  Urine Specific  (units    

(unknown)



                    date)                         Gravity 1.020 unknown)  

 

           (unknown)  (no       (unknown)  (unknown)  Vital Signs - 8  (units   

 (unknown)



                    date)                         hr        unknown)  

 

           (unknown)  (no       (unknown)  (unknown)  Vital Signs  (units    (un

known)



                    date)                                   unknown)  

 

           (unknown)  (no       (unknown)  (unknown)  Vital signs:  (units    (u

nknown)



                    date)                                   unknown)  

 

           (unknown)  (no       (unknown)  (unknown)  alcohol intake  (units    

(unknown)



                    date)                         frequency: a few unknown)  



                                                  times a month           

 

           (unknown)  (no       (unknown)  (unknown)  and below  (units    (unkn

own)



                    date)                                   unknown)  

 

           (unknown)  (no       (unknown)  (unknown)  completely  (units    (unk

nown)



                    date)                         resolved now. unknown)  

 

           (unknown)  (no       (unknown)  (unknown)  did not vomit.  (units    

(unknown)



                    date)                         He would a bowel unknown)  



                                                  movement. She           



                                                  had a normal day           



                                                  yesterday and           

 

           (unknown)  (no       (unknown)  (unknown)  it lasted for  (units    (

unknown)



                    date)                         30 minutes. unknown)  



                                                  During the time           



                                                  she felt            



                                                  nauseous and           



                                                  short of. She           

 

           (unknown)  (no       (unknown)  (unknown)  lower abdominal  (units   

 (unknown)



                    date)                         pain all across unknown)  



                                                  her lower           



                                                  abdomen. She           



                                                  took 600 mg of           



                                                  ibuprofen           

 

           (unknown)  (no       (unknown)  (unknown)  went to bed  (units    (un

known)



                    date)                         last night. No unknown)  



                                                  prior history of           



                                                  ovarian cyst.           



                                                  Her pain is           









                                         Result panel 3









           (unknown)  (no       (unknown)  (unknown)  (no value)  (units    (unk

nown)



                    date)                                   unknown)  

 

           (unknown)  (no       (unknown)  (unknown)  *Continue to  (units    (u

nknown)



                    date)                         take medications unknown)  



                                                  as directed           

 

           (unknown)  (no       (unknown)  (unknown)  *Follow up with  (units   

 (unknown)



                    date)                         your primary unknown)  



                                                  care provider in           



                                                  2-3 days or call           



                                                  [phone]           

 

           (unknown)  (no       (unknown)  (unknown)  *Return to ER  (units    (

unknown)



                    date)                         if you should unknown)  



                                                  have increasing           



                                                  pain nausea           



                                                  vomiting or any           



                                                  new,                

 

           (unknown)  (no       (unknown)  (unknown)  *What to do: At  (units   

 (unknown)



                    date)                         this time unknown)  



                                                  abdominal pain           



                                                  that resolved           



                                                  within 30           



                                                  minutes is           

 

           (unknown)  (no       (unknown)  (unknown)  *You have been  (units    

(unknown)



                    date)                         diagnosed with unknown)  



                                                  abdominal pain           

 

           (unknown)  (no       (unknown)  (unknown)  23  (units    (unkno

wn)



                    date)                                   unknown)  

 

           (unknown)  (no       (unknown)  (unknown)  04:20     (units    (unkno

wn)



                    date)                                   unknown)  

 

           (unknown)  (no       (unknown)  (unknown)  [id number]     (units    (unkno

wn)



                    date)                                   unknown)  

 

           (unknown)  (no       (unknown)  (unknown)  Abdominal pain  (units    

(unknown)



                    date)                                   unknown)  

 

           (unknown)  (no       (unknown)  (unknown)  Activity  (units    (unkno

wn)



                    date)                         Restrictions/Add unknown)  



                                                  itional             



                                                  Instructions:           

 

           (unknown)  (no       (unknown)  (unknown)  Age/Sex: 22 / F  (units   

 (unknown)



                    date)                                   unknown)  

 

           (unknown)  (no       (unknown)  (unknown)  Allergies  (units    (unkn

own)



                    date)                                   unknown)  

 

           (unknown)  (no       (unknown)  (unknown)  Allergy/AdvReac  (units   

 (unknown)



                    date)                         Type Severity unknown)  



                                                  Reaction Status           



                                                  Date / Time           

 

           (unknown)  (no       (unknown)  (unknown)  Bedside Urine  (units    (

unknown)



                    date)                         Bilirubin - unknown)  



                                                  Negative            

 

           (unknown)  (no       (unknown)  (unknown)  Bedside Urine  (units    (

unknown)



                    date)                         Glucose Negative unknown)  

 

           (unknown)  (no       (unknown)  (unknown)  Bedside Urine  (units    (

unknown)



                    date)                         Ketone -  unknown)  



                                                  Negative            

 

           (unknown)  (no       (unknown)  (unknown)  Bedside Urine  (units    (

unknown)



                    date)                         Leukocytes - unknown)  



                                                  Negative            

 

           (unknown)  (no       (unknown)  (unknown)  Bedside Urine  (units    (

unknown)



                    date)                         Nitrite - unknown)  



                                                  Negative            

 

           (unknown)  (no       (unknown)  (unknown)  Bedside Urine  (units    (

unknown)



                    date)                         Occult Blood unknown)  

 

           (unknown)  (no       (unknown)  (unknown)  Bedside Urine  (units    (

unknown)



                    date)                         Protein - unknown)  



                                                  Negative            

 

           (unknown)  (no       (unknown)  (unknown)  Bedside Urine  (units    (

unknown)



                    date)                         Urobilinogen - unknown)  



                                                  Negative            

 

           (unknown)  (no       (unknown)  (unknown)  Bedside Urine  (units    (

unknown)



                    date)                         pH 6.0    unknown)  

 

           (unknown)  (no       (unknown)  (unknown)  Blood Pressure  (units    

(unknown)



                    date)                         121/77 23 unknown)  



                                                  04:20               

 

           (unknown)  (no       (unknown)  (unknown)  Blood Pressure  (units    

(unknown)



                    date)                             unknown)  

 

           (unknown)  (no       (unknown)  (unknown)  Chief     (units    (unkno

wn)



                    date)                         Complaint: unknown)  



                                                  Abdominal Pain           

 

           (unknown)  (no       (unknown)  (unknown)  Clinical  (units    (unkno

wn)



                    date)                         Impression: unknown)  

 

           (unknown)  (no       (unknown)  (unknown)  Course    (units    (unkno

wn)



                    date)                                   unknown)  

 

           (unknown)  (no       (unknown)  (unknown)  : 2000  (units   

 (unknown)



                    date)                         Acct:[id number] unknown)  

 

           (unknown)  (no       (unknown)  (unknown)  Date of   (units    (unkno

wn)



                    date)                         Service:  unknown)  



                                                  23            

 

           (unknown)  (no       (unknown)  (unknown)  Departure  (units    (unkn

own)



                    date)                                   unknown)  

 

           (unknown)  (no       (unknown)  (unknown)  Discharge Plan  (units    

(unknown)



                    date)                                   unknown)  

 

           (unknown)  (no       (unknown)  (unknown)  ER Physician:  (units    (

unknown)



                    date)                         Paz Garcia unknown)  



                                                  D.O.                

 

           (unknown)  (no       (unknown)  (unknown)  Emergency  (units    (unkn

own)



                    date)                         Report    unknown)  

 

           (unknown)  (no       (unknown)  (unknown)  Esterase  (units    (unkno

wn)



                    date)                                   unknown)  

 

           (unknown)  (no       (unknown)  (unknown)  Exam      (units    (unkno

wn)



                    date)                                   unknown)  

 

           (unknown)  (no       (unknown)  (unknown)  General   (units    (unkno

wn)



                    date)                                   unknown)  

 

           (unknown)  (no       (unknown)  (unknown)  HPI - Abdominal  (units   

 (unknown)



                    date)                         Pain      unknown)  

 

           (unknown)  (no       (unknown)  (unknown)  HPI narrative:  (units    

(unknown)



                    date)                                   unknown)  

 

           (unknown)  (no       (unknown)  (unknown)  History of  (units    (unk

nown)



                    date)                         Present Illness unknown)  

 

           (unknown)  (no       (unknown)  (unknown)  Initial Vital  (units    (

unknown)



                    date)                         Signs     unknown)  

 

           (unknown)  (no       (unknown)  (unknown)  Initial Vital  (units    (

unknown)



                    date)                         Signs:    unknown)  

 

           (unknown)  (no       (unknown)  (unknown)  Instructions:  (units    (

unknown)



                    date)                         DI for Abdominal unknown)  



                                                  Pain-Adult           

 

           (unknown)  (no       (unknown)  (unknown)  [facility]  (units   

 (unknown)



                    date)                         [address] [place] [address] Street unknown)  



                                                  [place], [place]           



                                                  [id number]               

 

           (unknown)  (no       (unknown)  (unknown)  Lab Data  (units    (unkno

wn)



                    date)                                   unknown)  

 

           (unknown)  (no       (unknown)  (unknown)  MDM - Abdominal  (units   

 (unknown)



                    date)                         Pain      unknown)  

 

           (unknown)  (no       (unknown)  (unknown)  Mode of   (units    (unkno

wn)



                    date)                         arrival:  unknown)  



                                                  Ambulatory           

 

           (unknown)  (no       (unknown)  (unknown)  No Known Drug  (units    (

unknown)



                    date)                         Allergies unknown)  



                                                  Allergy Verified           



                                                  23 04:35           

 

           (unknown)  (no       (unknown)  (unknown)  Oxygen Delivery  (units   

 (unknown)



                    date)                         Method 23 unknown)  



                                                  04:20               

 

           (unknown)  (no       (unknown)  (unknown)  Oxygen Delivery  (units   

 (unknown)



                    date)                         Method Room Air unknown)  

 

           (unknown)  (no       (unknown)  (unknown)  Patient   (units    (unkno

wn)



                    date)                         Disposition: unknown)  



                                                  Home                

 

           (unknown)  (no       (unknown)  (unknown)  Patient History  (units   

 (unknown)



                    date)                                   unknown)  

 

           (unknown)  (no       (unknown)  (unknown)  Patient is a  (units    (u

nknown)



                    date)                         healthy   unknown)  



                                                  22-year-old           



                                                  female who woke           



                                                  up at 2:45 a.m.           



                                                  within can           

 

           (unknown)  (no       (unknown)  (unknown)  Patient:  (units    (unkno

wn)



                    date)                         Sean Sung unknown)  



                                                  MR#:            

 

           (unknown)  (no       (unknown)  (unknown)  Point of Care  (units    (

unknown)



                    date)                         Testing   unknown)  

 

           (unknown)  (no       (unknown)  (unknown)  Point of care  (units    (

unknown)



                    date)                         testing:  unknown)  

 

           (unknown)  (no       (unknown)  (unknown)  Pregnancy Test  (units    

(unknown)



                    date)                         Results Negative unknown)  

 

           (unknown)  (no       (unknown)  (unknown)  Provider,Maniidbe  (units   

 (unknown)



                    date)                         y PRAKASH [Primary unknown)  



                                                  Care Provider]           

 

           (unknown)  (no       (unknown)  (unknown)  Pulse Oximetry  (units    

(unknown)



                    date)                         100 23 unknown)  



                                                  04:20               

 

           (unknown)  (no       (unknown)  (unknown)  Pulse Oximetry  (units    

(unknown)



                    date)                         100       unknown)  

 

           (unknown)  (no       (unknown)  (unknown)  Pulse Rate 68  (units    (

unknown)



                    date)                         23 04:20 unknown)  

 

           (unknown)  (no       (unknown)  (unknown)  Pulse Rate 68  (units    (

unknown)



                    date)                                   unknown)  

 

           (unknown)  (no       (unknown)  (unknown) ROS        (units    (unkno

wn)



                    date)                         Unobtainable: unknown)  



                                                  All systems           



                                                  reviewed + are           



                                                  unremarkable           



                                                  except as noted           



                                                  in HPI              

 

           (unknown)  (no       (unknown)  (unknown)  Referrals:  (units    (unk

nown)



                    date)                                   unknown)  

 

           (unknown)  (no       (unknown)  (unknown)  Related Data  (units    (u

nknown)



                    date)                                   unknown)  

 

           (unknown)  (no       (unknown)  (unknown)  Respiratory  (units    (un

known)



                    date)                         Rate 18 23 unknown)  



                                                  04:20               

 

           (unknown)  (no       (unknown)  (unknown)  Respiratory  (units    (un

known)



                    date)                         Rate 18   unknown)  

 

           (unknown)  (no       (unknown)  (unknown)  Review of  (units    (unkn

own)



                    date)                         Systems   unknown)  

 

           (unknown)  (no       (unknown)  (unknown)  Signed By:  (units    (unk

nown)



                    date)                                   unknown)  

 

           (unknown)  (no       (unknown)  (unknown)  Smoking Status:  (units   

 (unknown)



                    date)                         Never smoker unknown)  

 

           (unknown)  (no       (unknown)  (unknown)  Social History  (units    

(unknown)



                    date)                         (Reviewed unknown)  



                                                  23 @ 05:19           



                                                  by Paz Garcia DO)           

 

           (unknown)  (no       (unknown)  (unknown)  Source: patient  (units   

 (unknown)



                    date)                                   unknown)  

 

           (unknown)  (no       (unknown)  (unknown)  Stand Alone  (units    (un

known)



                    date)                         Forms: Patient unknown)  



                                                  Portal/API           

 

           (unknown)  (no       (unknown)  (unknown)  Stated    (units    (unkno

wn)



                    date)                         Complaint: ABD unknown)  



                                                  PAIN                

 

           (unknown)  (no       (unknown)  (unknown)  Substance Use  (units    (

unknown)



                    date)                         Type: does not unknown)  



                                                  use                 

 

           (unknown)  (no       (unknown)  (unknown)  Temperature  (units    (un

known)



                    date)                         98.7 F 23 unknown)  



                                                  04:20               

 

           (unknown)  (no       (unknown)  (unknown)  Temperature  (units    (un

known)



                    date)                         98.7 F    unknown)  

 

           (unknown)  (no       (unknown)  (unknown)  Time Seen by  (units    (u

nknown)



                    date)                         Provider: unknown)  



                                                  23 05:06           

 

           (unknown)  (no       (unknown)  (unknown)  Urine Dip  (units    (unkn

own)



                    date)                                   unknown)  

 

           (unknown)  (no       (unknown)  (unknown)  Urine Specific  (units    

(unknown)



                    date)                         Gravity 1.020 unknown)  

 

           (unknown)  (no       (unknown)  (unknown)  Vital Signs - 8  (units   

 (unknown)



                    date)                         hr        unknown)  

 

           (unknown)  (no       (unknown)  (unknown)  Vital Signs  (units    (un

known)



                    date)                                   unknown)  

 

           (unknown)  (no       (unknown)  (unknown)  Vital signs:  (units    (u

nknown)



                    date)                                   unknown)  

 

           (unknown)  (no       (unknown)  (unknown)  abdominal pain  (units    

(unknown)



                    date)                         then please unknown)  



                                                  return to the           



                                                  emergency           



                                                  department           

 

           (unknown)  (no       (unknown)  (unknown)  alcohol intake  (units    

(unknown)



                    date)                         frequency: a few unknown)  



                                                  times a month           

 

           (unknown)  (no       (unknown)  (unknown)  and below  (units    (unkn

own)



                    date)                                   unknown)  

 

           (unknown)  (no       (unknown)  (unknown)  completely  (units    (unk

nown)



                    date)                         resolved now. unknown)  

 

           (unknown)  (no       (unknown)  (unknown)  did not vomit.  (units    

(unknown)



                    date)                         He would a bowel unknown)  



                                                  movement. She           



                                                  had a normal day           



                                                  yesterday and           

 

           (unknown)  (no       (unknown)  (unknown)  it lasted for  (units    (

unknown)



                    date)                         30 minutes. unknown)  



                                                  During the time           



                                                  she felt            



                                                  nauseous and           



                                                  short of. She           

 

           (unknown)  (no       (unknown)  (unknown)  lower abdominal  (units   

 (unknown)



                    date)                         pain all across unknown)  



                                                  her lower           



                                                  abdomen. She           



                                                  took 600 mg of           



                                                  ibuprofen           

 

           (unknown)  (no       (unknown)  (unknown)  overall   (units    (unkno

wn)



                    date)                         reassuring. unknown)  



                                                  However if you           



                                                  should have new           



                                                  worsening or           



                                                  persistent           

 

           (unknown)  (no       (unknown)  (unknown)  went to bed  (units    (un

known)



                    date)                         last night. No unknown)  



                                                  prior history of           



                                                  ovarian cyst.           



                                                  Her pain is           

 

           (unknown)  (no       (unknown)  (unknown)  worsening or  (units    (u

nknown)



                    date)                         concerning unknown)  



                                                  symptoms            









                                         Result panel 4









           (unknown)  (no       (unknown)  (unknown)  (no value)  (units    (unk

nown)



                    date)                                   unknown)  

 

           (unknown)  (no       (unknown)  (unknown)  *Continue to take  (units 

   (unknown)



                    date)                         medications as unknown)  



                                                  directed            

 

           (unknown)  (no       (unknown)  (unknown)  *Follow up with  (units   

 (unknown)



                    date)                         your primary care unknown)  



                                                  provider in 2-3           



                                                  days or call           



                                                  [phone]           

 

           (unknown)  (no       (unknown)  (unknown)  *Return to ER if  (units  

  (unknown)



                    date)                         you should have unknown)  



                                                  increasing pain           



                                                  nausea vomiting or           



                                                  any new,            

 

           (unknown)  (no       (unknown)  (unknown)  *What to do: At  (units   

 (unknown)



                    date)                         this time unknown)  



                                                  abdominal pain           



                                                  that resolved           



                                                  within 30 minutes           



                                                  is                  

 

           (unknown)  (no       (unknown)  (unknown)  *You have been  (units    

(unknown)



                    date)                         diagnosed with unknown)  



                                                  abdominal pain           

 

           (unknown)  (no       (unknown)  (unknown)  02/02/23  (units    (unkno

wn)



                    date)                                   unknown)  

 

           (unknown)  (no       (unknown)  (unknown)  04:20     (units    (unkno

wn)



                    date)                                   unknown)  

 

           (unknown)  (no       (unknown)  (unknown)  [id number]     (units    (unkno

wn)



                    date)                                   unknown)  

 

           (unknown)  (no       (unknown)  (unknown)  ABDOMEN: Soft,  (units    

(unknown)



                    date)                         nontender. unknown)  



                                                  Normoactive bowel           



                                                  sounds all 4           



                                                  quadrants. No           

 

           (unknown)  (no       (unknown)  (unknown)  Abdominal pain  (units    

(unknown)



                    date)                                   unknown)  

 

           (unknown)  (no       (unknown)  (unknown)  Activity  (units    (unkno

wn)



                    date)                         Restrictions/Addit unknown)  



                                                  ional               



                                                  Instructions:           

 

           (unknown)  (no       (unknown)  (unknown)  Age/Sex: 22 / F  (units   

 (unknown)



                    date)                                   unknown)  

 

           (unknown)  (no       (unknown)  (unknown)  Allergies  (units    (unkn

own)



                    date)                                   unknown)  

 

           (unknown)  (no       (unknown)  (unknown)  Allergy/AdvReac  (units   

 (unknown)



                    date)                         Type Severity unknown)  



                                                  Reaction Status           



                                                  Date / Time           

 

           (unknown)  (no       (unknown)  (unknown)  Bedside Urine  (units    (

unknown)



                    date)                         Bilirubin - unknown)  



                                                  Negative            

 

           (unknown)  (no       (unknown)  (unknown)  Bedside Urine  (units    (

unknown)



                    date)                         Glucose Negative unknown)  

 

           (unknown)  (no       (unknown)  (unknown)  Bedside Urine  (units    (

unknown)



                    date)                         Ketone - Negative unknown)  

 

           (unknown)  (no       (unknown)  (unknown)  Bedside Urine  (units    (

unknown)



                    date)                         Leukocytes - unknown)  



                                                  Negative            

 

           (unknown)  (no       (unknown)  (unknown)  Bedside Urine  (units    (

unknown)



                    date)                         Nitrite - Negative unknown)  

 

           (unknown)  (no       (unknown)  (unknown)  Bedside Urine  (units    (

unknown)



                    date)                         Occult Blood unknown)  

 

           (unknown)  (no       (unknown)  (unknown)  Bedside Urine  (units    (

unknown)



                    date)                         Protein - Negative unknown)  

 

           (unknown)  (no       (unknown)  (unknown)  Bedside Urine  (units    (

unknown)



                    date)                         Urobilinogen - unknown)  



                                                  Negative            

 

           (unknown)  (no       (unknown)  (unknown)  Bedside Urine pH  (units  

  (unknown)



                    date)                         6.0       unknown)  

 

           (unknown)  (no       (unknown)  (unknown)  Blood Pressure  (units    

(unknown)



                    date)                         121/77 23 unknown)  



                                                  04:20               

 

           (unknown)  (no       (unknown)  (unknown)  Blood Pressure  (units    

(unknown)



                    date)                         121/77    unknown)  

 

           (unknown)  (no       (unknown)  (unknown)  CARDIOVASCULAR:  (units   

 (unknown)



                    date)                         Regular rate and unknown)  



                                                  rhythm without           



                                                  murmurs, rubs or           



                                                  gallops.            

 

           (unknown)  (no       (unknown)  (unknown)  Chief Complaint:  (units  

  (unknown)



                    date)                         Abdominal Pain unknown)  

 

           (unknown)  (no       (unknown)  (unknown)  Clinical  (units    (unkno

wn)



                    date)                         Impression: unknown)  

 

           (unknown)  (no       (unknown)  (unknown)  Course    (units    (unkno

wn)



                    date)                                   unknown)  

 

           (unknown)  (no       (unknown)  (unknown)  : 2000  (units   

 (unknown)



                    date)                         Acct:[id number] unknown)  

 

           (unknown)  (no       (unknown)  (unknown)  Date of Service:  (units  

  (unknown)



                    date)                         23  unknown)  

 

           (unknown)  (no       (unknown)  (unknown)  Departure  (units    (unkn

own)



                    date)                                   unknown)  

 

           (unknown)  (no       (unknown)  (unknown)  Discharge Plan  (units    

(unknown)



                    date)                                   unknown)  

 

           (unknown)  (no       (unknown)  (unknown)  ER Physician:  (units    (

unknown)



                    date)                         Paz Garcia unknown)  



                                                  D.O.                

 

           (unknown)  (no       (unknown)  (unknown)  EXTREMITIES:  (units    (u

nknown)



                    date)                         Normal range of unknown)  



                                                  motion, no           



                                                  clubbing or edema.           



                                                  Neurovascularly           

 

           (unknown)  (no       (unknown)  (unknown)  Emergency Report  (units  

  (unknown)



                    date)                                   unknown)  

 

           (unknown)  (no       (unknown)  (unknown)  Esterase  (units    (unkno

wn)



                    date)                                   unknown)  

 

           (unknown)  (no       (unknown)  (unknown)  Exam      (units    (unkno

wn)



                    date)                                   unknown)  

 

           (unknown)  (no       (unknown)  (unknown)  GENERAL: Alert  (units    

(unknown)



                    date)                         well-appearing unknown)  



                                                  22-year-old female           



                                                  and in no acute           



                                                  distress.           

 

           (unknown)  (no       (unknown)  (unknown)  General   (units    (unkno

wn)



                    date)                                   unknown)  

 

           (unknown)  (no       (unknown)  (unknown)  HEENT: Head  (units    (un

known)



                    date)                         atraumatic,EOMI, unknown)  



                                                  pupils reactive,           



                                                  face symmetric,           



                                                  moist mucous           

 

           (unknown)  (no       (unknown)  (unknown)  HPI - Abdominal  (units   

 (unknown)



                    date)                         Pain      unknown)  

 

           (unknown)  (no       (unknown)  (unknown)  HPI narrative:  (units    

(unknown)



                    date)                                   unknown)  

 

           (unknown)  (no       (unknown)  (unknown)  History of  (units    (unk

nown)



                    date)                         Present Illness unknown)  

 

           (unknown)  (no       (unknown)  (unknown)  Initial Vital  (units    (

unknown)



                    date)                         Signs     unknown)  

 

           (unknown)  (no       (unknown)  (unknown)  Initial Vital  (units    (

unknown)



                    date)                         Signs:    unknown)  

 

           (unknown)  (no       (unknown)  (unknown)  Instructions: DI  (units  

  (unknown)



                    date)                         for Abdominal unknown)  



                                                  Pain-Adult           

 

           (unknown)  (no       (unknown)  (unknown)  [facility]  (units   

 (unknown)



                    date)                         [address] unknown)  



                                                  [place], [place]           



                                                  [id number]               

 

           (unknown)  (no       (unknown)  (unknown)  Lab Data  (units    (unkno

wn)



                    date)                                   unknown)  

 

           (unknown)  (no       (unknown)  (unknown)  MDM - Abdominal  (units   

 (unknown)



                    date)                         Pain      unknown)  

 

           (unknown)  (no       (unknown)  (unknown)  MDM Narrative  (units    (

unknown)



                    date)                                   unknown)  

 

           (unknown)  (no       (unknown)  (unknown)  Medical decision  (units  

  (unknown)



                    date)                         making narrative: unknown)  

 

           (unknown)  (no       (unknown)  (unknown)  Mode of arrival:  (units  

  (unknown)



                    date)                         Ambulatory unknown)  

 

           (unknown)  (no       (unknown)  (unknown)  NEUROLOGICAL:  (units    (

unknown)



                    date)                         Alert and oriented unknown)  



                                                  x4.                 

 

           (unknown)  (no       (unknown)  (unknown)  No Known Drug  (units    (

unknown)



                    date)                         Allergies Allergy unknown)  



                                                  Verified 23           



                                                  04:35               

 

           (unknown)  (no       (unknown)  (unknown)  Oxygen Delivery  (units   

 (unknown)



                    date)                         Method 23 unknown)  



                                                  04:20               

 

           (unknown)  (no       (unknown)  (unknown)  Oxygen Delivery  (units   

 (unknown)



                    date)                         Method Room Air unknown)  

 

           (unknown)  (no       (unknown)  (unknown)  Patient   (units    (unkno

wn)



                    date)                         Disposition: Home unknown)  

 

           (unknown)  (no       (unknown)  (unknown)  Patient History  (units   

 (unknown)



                    date)                                   unknown)  

 

           (unknown)  (no       (unknown)  (unknown)  Patient is a  (units    (u

nknown)



                    date)                         healthy   unknown)  



                                                  22-year-old female           



                                                  who woke up at           



                                                  2:45 a.m. within           



                                                  can                 

 

           (unknown)  (no       (unknown)  (unknown)  Patient is a  (units    (u

nknown)



                    date)                         healthy   unknown)  



                                                  well-appearing           



                                                  22-year-old female           



                                                  presenting today           



                                                  with                

 

           (unknown)  (no       (unknown)  (unknown)  Patient:  (units    (unkno

wn)



                    date)                         Sean Sung MR#: unknown)  



                                                                 

 

           (unknown)  (no       (unknown)  (unknown)  Point of Care  (units    (

unknown)



                    date)                         Testing   unknown)  

 

           (unknown)  (no       (unknown)  (unknown)  Point of care  (units    (

unknown)



                    date)                         testing:  unknown)  

 

           (unknown)  (no       (unknown)  (unknown)  Pregnancy Test  (units    

(unknown)



                    date)                         Results Negative unknown)  

 

           (unknown)  (no       (unknown)  (unknown)  Provider,Min  (units  

  (unknown)



                    date)                         PRAKASH [Primary Care unknown)  



                                                  Provider]           

 

           (unknown)  (no       (unknown)  (unknown)  Pulse Oximetry  (units    

(unknown)



                    date)                         100 23 04:20 unknown)  

 

           (unknown)  (no       (unknown)  (unknown)  Pulse Oximetry  (units    

(unknown)



                    date)                         100       unknown)  

 

           (unknown)  (no       (unknown)  (unknown)  Pulse Rate 68  (units    (

unknown)



                    date)                         23 04:20 unknown)  

 

           (unknown)  (no       (unknown)  (unknown)  Pulse Rate 68  (units    (

unknown)



                    date)                                   unknown)  

 

           (unknown)  (no       (unknown)  (unknown)  RESPIRATORY:  (units    (u

nknown)



                    date)                         Breath sounds unknown)  



                                                  equal bilaterally,           



                                                  no wheezes rales           



                                                  or rhonchi.           

 

           (unknown)  (no       (unknown)  (unknown) ROS Unobtainable:  (units  

  (unknown)



                    date)                         All systems unknown)  



                                                  reviewed + are           



                                                  unremarkable           



                                                  except as noted in           



                                                  HPI                 

 

           (unknown)  (no       (unknown)  (unknown)  Referrals:  (units    (unk

nown)



                    date)                                   unknown)  

 

           (unknown)  (no       (unknown)  (unknown)  Related Data  (units    (u

nknown)



                    date)                                   unknown)  

 

           (unknown)  (no       (unknown)  (unknown)  Respiratory Rate  (units  

  (unknown)



                    date)                         18 23 04:20 unknown)  

 

           (unknown)  (no       (unknown)  (unknown)  Respiratory Rate  (units  

  (unknown)



                    date)                         18        unknown)  

 

           (unknown)  (no       (unknown)  (unknown)  Review of Systems  (units 

   (unknown)



                    date)                                   unknown)  

 

           (unknown)  (no       (unknown)  (unknown)  SKIN: Warm, dry,  (units  

  (unknown)



                    date)                         no laceration, no unknown)  



                                                  petechiae, no           



                                                  rashes or lesions.           

 

           (unknown)  (no       (unknown)  (unknown)  She is no  (units    (unkn

own)



                    date)                         recurrence of unknown)  



                                                  pain. She was           



                                                  previously within           



                                                  normal limits.           

 

           (unknown)  (no       (unknown)  (unknown)  Signed By:  (units    (unk

nown)



                    date)                                   unknown)  

 

           (unknown)  (no       (unknown)  (unknown)  Smoking Status:  (units   

 (unknown)



                    date)                         Never smoker unknown)  

 

           (unknown)  (no       (unknown)  (unknown)  Social History  (units    

(unknown)



                    date)                         (Reviewed 23 unknown)  



                                                  @ 05:19 by Paz Garcia DO)           

 

           (unknown)  (no       (unknown)  (unknown)  Source: patient  (units   

 (unknown)



                    date)                                   unknown)  

 

           (unknown)  (no       (unknown)  (unknown)  Stand Alone  (units    (un

known)



                    date)                         Forms: Patient unknown)  



                                                  Portal/API, Work           



                                                  Release Note           

 

           (unknown)  (no       (unknown)  (unknown)  Stated Complaint:  (units 

   (unknown)



                    date)                         ABD PAIN  unknown)  

 

           (unknown)  (no       (unknown)  (unknown)  Substance Use  (units    (

unknown)



                    date)                         Type: does not use unknown)  

 

           (unknown)  (no       (unknown)  (unknown)  Temperature 98.7  (units  

  (unknown)



                    date)                         F 23 04:20 unknown)  

 

           (unknown)  (no       (unknown)  (unknown)  Temperature 98.7  (units  

  (unknown)



                    date)                         F         unknown)  

 

           (unknown)  (no       (unknown)  (unknown)  Time Seen by  (units    (u

nknown)



                    date)                         Provider: 23 unknown)  



                                                  05:06               

 

           (unknown)  (no       (unknown)  (unknown)  Urinalysis and  (units    

(unknown)



                    date)                         pregnancy are unknown)  



                                                  negative.           



                                                  Differential           



                                                  diagnosis includes           



                                                  ovarian             

 

           (unknown)  (no       (unknown)  (unknown)  Urine Dip  (units    (unkn

own)



                    date)                                   unknown)  

 

           (unknown)  (no       (unknown)  (unknown)  Urine Specific  (units    

(unknown)



                    date)                         Gravity 1.020 unknown)  

 

           (unknown)  (no       (unknown)  (unknown)  Vital Signs - 8  (units   

 (unknown)



                    date)                         hr        unknown)  

 

           (unknown)  (no       (unknown)  (unknown)  Vital Signs  (units    (un

known)



                    date)                                   unknown)  

 

           (unknown)  (no       (unknown)  (unknown)  Vital signs:  (units    (u

nknown)



                    date)                                   unknown)  

 

           (unknown)  (no       (unknown)  (unknown)  abdominal pain  (units    

(unknown)



                    date)                         then please return unknown)  



                                                  to the emergency           



                                                  department           

 

           (unknown)  (no       (unknown)  (unknown)  alcohol intake  (units    

(unknown)



                    date)                         frequency: a few unknown)  



                                                  times a month           

 

           (unknown)  (no       (unknown)  (unknown)  and below  (units    (unkn

own)



                    date)                                   unknown)  

 

           (unknown)  (no       (unknown)  (unknown)  any chest pain no  (units 

   (unknown)



                    date)                         cough no rash no unknown)  



                                                  other symptoms.           

 

           (unknown)  (no       (unknown)  (unknown)  cyst rupture,  (units    (

unknown)



                    date)                         ectopic pregnancy, unknown)  



                                                  appendicitis.           



                                                  However I think           



                                                  all of these are           

 

           (unknown)  (no       (unknown)  (unknown)  did not vomit.  (units    

(unknown)



                    date)                         She had a normal unknown)  



                                                  day yesterday and           



                                                  went to bed last           



                                                  night. No           

 

           (unknown)  (no       (unknown)  (unknown)  guarding or  (units    (un

known)



                    date)                         rebound.  unknown)  

 

           (unknown)  (no       (unknown)  (unknown)  imaging needed.  (units   

 (unknown)



                    date)                                   unknown)  

 

           (unknown)  (no       (unknown)  (unknown)  intact    (units    (unkno

wn)



                    date)                                   unknown)  

 

           (unknown)  (no       (unknown)  (unknown)  it lasted for 30  (units  

  (unknown)



                    date)                         minutes. During unknown)  



                                                  the time she felt           



                                                  nauseous and short           



                                                  of. She             

 

           (unknown)  (no       (unknown)  (unknown)  lower abdominal  (units   

 (unknown)



                    date)                         pain all across unknown)  



                                                  her lower abdomen.           



                                                  She took 600 mg of           



                                                  ibuprofen           

 

           (unknown)  (no       (unknown)  (unknown)  membranes  (units    (unkn

own)



                    date)                                   unknown)  

 

           (unknown)  (no       (unknown)  (unknown)  overall   (units    (unkno

wn)



                    date)                         reassuring. unknown)  



                                                  However if you           



                                                  should have new           



                                                  worsening or           



                                                  persistent           

 

           (unknown)  (no       (unknown)  (unknown)  patient and  (units    (un

known)



                    date)                         partner. At this unknown)  



                                                  time I feel that           



                                                  there is no           



                                                  further testing or           

 

           (unknown)  (no       (unknown)  (unknown)  prior history of  (units  

  (unknown)



                    date)                         ovarian cyst. Her unknown)  



                                                  pain is completely           



                                                  resolved now. She           



                                                  denies              

 

           (unknown)  (no       (unknown)  (unknown)  relatively  (units    (unk

nown)



                    date)                         unlikely with how unknown)  



                                                  quickly symptoms           



                                                  resolved. I           



                                                  discussed this           



                                                  with                

 

           (unknown)  (no       (unknown)  (unknown)  sudden onset of  (units   

 (unknown)



                    date)                         severe abdominal unknown)  



                                                  pain lasting 30           



                                                  minutes resolving           



                                                  with Motrin.           

 

           (unknown)  (no       (unknown)  (unknown)  worsening or  (units    (u

nknown)



                    date)                         concerning unknown)  



                                                  symptoms            









                                         Result panel 5









           (unknown)  (no       (unknown)  (unknown)  (no value)  (units    (unk

nown)



                    date)                                   unknown)  

 

           (unknown)  (no       (unknown)  (unknown)  *Continue to take  (units 

   (unknown)



                    date)                         medications as unknown)  



                                                  directed            

 

           (unknown)  (no       (unknown)  (unknown)  *Follow up with  (units   

 (unknown)



                    date)                         your primary care unknown)  



                                                  provider in 2-3           



                                                  days or call           



                                                  [phone]           

 

           (unknown)  (no       (unknown)  (unknown)  *Return to ER if  (units  

  (unknown)



                    date)                         you should have unknown)  



                                                  increasing pain           



                                                  nausea vomiting or           



                                                  any new,            

 

           (unknown)  (no       (unknown)  (unknown)  *What to do: At  (units   

 (unknown)



                    date)                         this time unknown)  



                                                  abdominal pain           



                                                  that resolved           



                                                  within 30 minutes           



                                                  is                  

 

           (unknown)  (no       (unknown)  (unknown)  *You have been  (units    

(unknown)



                    date)                         diagnosed with unknown)  



                                                  abdominal pain           

 

           (unknown)  (no       (unknown)  (unknown)  23  (units    (unkno

wn)



                    date)                                   unknown)  

 

           (unknown)  (no       (unknown)  (unknown)  04:20     (units    (unkno

wn)



                    date)                                   unknown)  

 

           (unknown)  (no       (unknown)  (unknown)  24312     (units    (unkno

wn)



                    date)                                   unknown)  

 

           (unknown)  (no       (unknown)  (unknown)  ABDOMEN: Soft,  (units    

(unknown)



                    date)                         nontender. unknown)  



                                                  Normoactive bowel           



                                                  sounds all 4           



                                                  quadrants. No           

 

           (unknown)  (no       (unknown)  (unknown)  Abdominal pain  (units    

(unknown)



                    date)                                   unknown)  

 

           (unknown)  (no       (unknown)  (unknown)  Activity  (units    (unkno

wn)



                    date)                         Restrictions/Addit unknown)  



                                                  ional               



                                                  Instructions:           

 

           (unknown)  (no       (unknown)  (unknown)  Age/Sex: 22 / F  (units   

 (unknown)



                    date)                                   unknown)  

 

           (unknown)  (no       (unknown)  (unknown)  Allergies  (units    (unkn

own)



                    date)                                   unknown)  

 

           (unknown)  (no       (unknown)  (unknown)  Allergy/AdvReac  (units   

 (unknown)



                    date)                         Type Severity unknown)  



                                                  Reaction Status           



                                                  Date / Time           

 

           (unknown)  (no       (unknown)  (unknown)  Bedside Urine  (units    (

unknown)



                    date)                         Bilirubin - unknown)  



                                                  Negative            

 

           (unknown)  (no       (unknown)  (unknown)  Bedside Urine  (units    (

unknown)



                    date)                         Glucose Negative unknown)  

 

           (unknown)  (no       (unknown)  (unknown)  Bedside Urine  (units    (

unknown)



                    date)                         Ketone - Negative unknown)  

 

           (unknown)  (no       (unknown)  (unknown)  Bedside Urine  (units    (

unknown)



                    date)                         Leukocytes - unknown)  



                                                  Negative            

 

           (unknown)  (no       (unknown)  (unknown)  Bedside Urine  (units    (

unknown)



                    date)                         Nitrite - Negative unknown)  

 

           (unknown)  (no       (unknown)  (unknown)  Bedside Urine  (units    (

unknown)



                    date)                         Occult Blood unknown)  

 

           (unknown)  (no       (unknown)  (unknown)  Bedside Urine  (units    (

unknown)



                    date)                         Protein - Negative unknown)  

 

           (unknown)  (no       (unknown)  (unknown)  Bedside Urine  (units    (

unknown)



                    date)                         Urobilinogen - unknown)  



                                                  Negative            

 

           (unknown)  (no       (unknown)  (unknown)  Bedside Urine pH  (units  

  (unknown)



                    date)                         6.0       unknown)  

 

           (unknown)  (no       (unknown)  (unknown)  Blood Pressure  (units    

(unknown)



                    date)                         121/77 23 unknown)  



                                                  04:20               

 

           (unknown)  (no       (unknown)  (unknown)  Blood Pressure  (units    

(unknown)



                    date)                             unknown)  

 

           (unknown)  (no       (unknown)  (unknown)  CARDIOVASCULAR:  (units   

 (unknown)



                    date)                         Regular rate and unknown)  



                                                  rhythm without           



                                                  murmurs, rubs or           



                                                  gallops.            

 

           (unknown)  (no       (unknown)  (unknown)  Chief Complaint:  (units  

  (unknown)



                    date)                         Abdominal Pain unknown)  

 

           (unknown)  (no       (unknown)  (unknown)  Clinical  (units    (unkno

wn)



                    date)                         Impression: unknown)  

 

           (unknown)  (no       (unknown)  (unknown)  Course    (units    (unkno

wn)



                    date)                                   unknown)  

 

           (unknown)  (no       (unknown)  (unknown)  : 2000  (units   

 (unknown)



                    date)                         Acct:[id number] unknown)  

 

           (unknown)  (no       (unknown)  (unknown)  Date of Service:  (units  

  (unknown)



                    date)                         23  unknown)  

 

           (unknown)  (no       (unknown)  (unknown)  Departure  (units    (unkn

own)



                    date)                                   unknown)  

 

           (unknown)  (no       (unknown)  (unknown)  Discharge Plan  (units    

(unknown)



                    date)                                   unknown)  

 

           (unknown)  (no       (unknown)  (unknown)  ER Physician:  (units    (

unknown)



                    date)                         Paz Garcia unknown)  



                                                  D.O.                

 

           (unknown)  (no       (unknown)  (unknown)  EXTREMITIES:  (units    (u

nknown)



                    date)                         Normal range of unknown)  



                                                  motion, no           



                                                  clubbing or edema.           



                                                  Neurovascularly           

 

           (unknown)  (no       (unknown)  (unknown)  Emergency Report  (units  

  (unknown)



                    date)                                   unknown)  

 

           (unknown)  (no       (unknown)  (unknown)  Esterase  (units    (unkno

wn)



                    date)                                   unknown)  

 

           (unknown)  (no       (unknown)  (unknown)  Exam      (units    (unkno

wn)



                    date)                                   unknown)  

 

           (unknown)  (no       (unknown)  (unknown)  GENERAL: Alert  (units    

(unknown)



                    date)                         well-appearing unknown)  



                                                  22-year-old female           



                                                  and in no acute           



                                                  distress.           

 

           (unknown)  (no       (unknown)  (unknown)  General   (units    (unkno

wn)



                    date)                                   unknown)  

 

           (unknown)  (no       (unknown)  (unknown)  HEENT: Head  (units    (un

known)



                    date)                         atraumatic,EOMI, unknown)  



                                                  pupils reactive,           



                                                  face symmetric,           



                                                  moist mucous           

 

           (unknown)  (no       (unknown)  (unknown)  HPI - Abdominal  (units   

 (unknown)



                    date)                         Pain      unknown)  

 

           (unknown)  (no       (unknown)  (unknown)  HPI narrative:  (units    

(unknown)



                    date)                                   unknown)  

 

           (unknown)  (no       (unknown)  (unknown)  History of  (units    (unk

nown)



                    date)                         Present Illness unknown)  

 

           (unknown)  (no       (unknown)  (unknown)  Initial Vital  (units    (

unknown)



                    date)                         Signs     unknown)  

 

           (unknown)  (no       (unknown)  (unknown)  Initial Vital  (units    (

unknown)



                    date)                         Signs:    unknown)  

 

           (unknown)  (no       (unknown)  (unknown)  Instructions: DI  (units  

  (unknown)



                    date)                         for Abdominal unknown)  



                                                  Pain-Adult           

 

           (unknown)  (no       (unknown)  (unknown)  [facility]  (units   

 (unknown)



                    date)                         [address] unknown)  



                                                  [place], [place]           



                                                  [id number]               

 

           (unknown)  (no       (unknown)  (unknown)  Lab Data  (units    (unkno

wn)



                    date)                                   unknown)  

 

           (unknown)  (no       (unknown)  (unknown)  MDM - Abdominal  (units   

 (unknown)



                    date)                         Pain      unknown)  

 

           (unknown)  (no       (unknown)  (unknown)  MDM Narrative  (units    (

unknown)



                    date)                                   unknown)  

 

           (unknown)  (no       (unknown)  (unknown)  Medical decision  (units  

  (unknown)



                    date)                         making narrative: unknown)  

 

           (unknown)  (no       (unknown)  (unknown)  Mode of arrival:  (units  

  (unknown)



                    date)                         Ambulatory unknown)  

 

           (unknown)  (no       (unknown)  (unknown)  NEUROLOGICAL:  (units    (

unknown)



                    date)                         Alert and oriented unknown)  



                                                  x4.                 

 

           (unknown)  (no       (unknown)  (unknown)  No Known Drug  (units    (

unknown)



                    date)                         Allergies Allergy unknown)  



                                                  Verified 23           



                                                  04:35               

 

           (unknown)  (no       (unknown)  (unknown)  Oxygen Delivery  (units   

 (unknown)



                    date)                         Method 23 unknown)  



                                                  04:20               

 

           (unknown)  (no       (unknown)  (unknown)  Oxygen Delivery  (units   

 (unknown)



                    date)                         Method Room Air unknown)  

 

           (unknown)  (no       (unknown)  (unknown)  Patient   (units    (unkno

wn)



                    date)                         Disposition: Home unknown)  

 

           (unknown)  (no       (unknown)  (unknown)  Patient History  (units   

 (unknown)



                    date)                                   unknown)  

 

           (unknown)  (no       (unknown)  (unknown)  Patient is a  (units    (u

nknown)



                    date)                         healthy   unknown)  



                                                  22-year-old female           



                                                  who woke up at           



                                                  2:45 a.m. within           



                                                  can                 

 

           (unknown)  (no       (unknown)  (unknown)  Patient is a  (units    (u

nknown)



                    date)                         healthy   unknown)  



                                                  well-appearing           



                                                  22-year-old female           



                                                  presenting today           



                                                  with                

 

           (unknown)  (no       (unknown)  (unknown)  Patient:  (units    (unkno

wn)



                    date)                         Sean Sung MR#: unknown)  



                                                                 

 

           (unknown)  (no       (unknown)  (unknown)  Point of Care  (units    (

unknown)



                    date)                         Testing   unknown)  

 

           (unknown)  (no       (unknown)  (unknown)  Point of care  (units    (

unknown)



                    date)                         testing:  unknown)  

 

           (unknown)  (no       (unknown)  (unknown)  Pregnancy Test  (units    

(unknown)



                    date)                         Results Negative unknown)  

 

           (unknown)  (no       (unknown)  (unknown)  Provider,Min  (units  

  (unknown)



                    date)                         PRAKASH [[facility] unknown)  



                                                  Provider]           

 

           (unknown)  (no       (unknown)  (unknown)  Pulse Oximetry  (units    

(unknown)



                    date)                         100 23 04:20 unknown)  

 

           (unknown)  (no       (unknown)  (unknown)  Pulse Oximetry  (units    

(unknown)



                    date)                         100       unknown)  

 

           (unknown)  (no       (unknown)  (unknown)  Pulse Rate 68  (units    (

unknown)



                    date)                         23 04:20 unknown)  

 

           (unknown)  (no       (unknown)  (unknown)  Pulse Rate 68  (units    (

unknown)



                    date)                                   unknown)  

 

           (unknown)  (no       (unknown)  (unknown)  RESPIRATORY:  (units    (u

nknown)



                    date)                         Breath sounds unknown)  



                                                  equal bilaterally,           



                                                  no wheezes rales           



                                                  or rhonchi.           

 

           (unknown)  (no       (unknown)  (unknown) ROS Unobtainable:  (units  

  (unknown)



                    date)                         All systems unknown)  



                                                  reviewed + are           



                                                  unremarkable           



                                                  except as noted in           



                                                  HPI                 

 

           (unknown)  (no       (unknown)  (unknown)  Referrals:  (units    (unk

nown)



                    date)                                   unknown)  

 

           (unknown)  (no       (unknown)  (unknown)  Related Data  (units    (u

nknown)



                    date)                                   unknown)  

 

           (unknown)  (no       (unknown)  (unknown)  Respiratory Rate  (units  

  (unknown)



                    date)                         18 23 04:20 unknown)  

 

           (unknown)  (no       (unknown)  (unknown)  Respiratory Rate  (units  

  (unknown)



                    date)                         18        unknown)  

 

           (unknown)  (no       (unknown)  (unknown)  Review of Systems  (units 

   (unknown)



                    date)                                   unknown)  

 

           (unknown)  (no       (unknown)  (unknown)  SKIN: Warm, dry,  (units  

  (unknown)



                    date)                         no laceration, no unknown)  



                                                  petechiae, no           



                                                  rashes or lesions.           

 

           (unknown)  (no       (unknown)  (unknown)  She is no  (units    (unkn

own)



                    date)                         recurrence of unknown)  



                                                  pain. She was           



                                                  previously within           



                                                  normal limits.           

 

           (unknown)  (no       (unknown)  (unknown)  Signed By:  (units    (unk

nown)



                    date)                                   unknown)  

 

           (unknown)  (no       (unknown)  (unknown)  Smoking Status:  (units   

 (unknown)



                    date)                         Never smoker unknown)  

 

           (unknown)  (no       (unknown)  (unknown)  Social History  (units    

(unknown)



                    date)                         (Reviewed 23 unknown)  



                                                  @ 05:19 by Paz Garcia DO)           

 

           (unknown)  (no       (unknown)  (unknown)  Source: patient  (units   

 (unknown)



                    date)                                   unknown)  

 

           (unknown)  (no       (unknown)  (unknown)  Stand Alone  (units    (un

known)



                    date)                         Forms: Patient unknown)  



                                                  Portal/API, Work           



                                                  Release Note           

 

           (unknown)  (no       (unknown)  (unknown)  Stated Complaint:  (units 

   (unknown)



                    date)                         ABD PAIN  unknown)  

 

           (unknown)  (no       (unknown)  (unknown)  Substance Use  (units    (

unknown)



                    date)                         Type: does not use unknown)  

 

           (unknown)  (no       (unknown)  (unknown)  Temperature 98.7  (units  

  (unknown)



                    date)                         F 23 04:20 unknown)  

 

           (unknown)  (no       (unknown)  (unknown)  Temperature 98.7  (units  

  (unknown)



                    date)                         F         unknown)  

 

           (unknown)  (no       (unknown)  (unknown)  Time Seen by  (units    (u

nknown)



                    date)                         Provider: 23 unknown)  



                                                  05:06               

 

           (unknown)  (no       (unknown)  (unknown)  Urinalysis and  (units    

(unknown)



                    date)                         pregnancy are unknown)  



                                                  negative.           



                                                  Differential           



                                                  diagnosis includes           



                                                  ovarian             

 

           (unknown)  (no       (unknown)  (unknown)  Urine Dip  (units    (unkn

own)



                    date)                                   unknown)  

 

           (unknown)  (no       (unknown)  (unknown)  Urine Specific  (units    

(unknown)



                    date)                         Gravity 1.020 unknown)  

 

           (unknown)  (no       (unknown)  (unknown)  Vital Signs - 8  (units   

 (unknown)



                    date)                         hr        unknown)  

 

           (unknown)  (no       (unknown)  (unknown)  Vital Signs  (units    (un

known)



                    date)                                   unknown)  

 

           (unknown)  (no       (unknown)  (unknown)  Vital signs:  (units    (u

nknown)



                    date)                                   unknown)  

 

           (unknown)  (no       (unknown)  (unknown)  abdominal pain  (units    

(unknown)



                    date)                         then please return unknown)  



                                                  to the emergency           



                                                  department           

 

           (unknown)  (no       (unknown)  (unknown)  alcohol intake  (units    

(unknown)



                    date)                         frequency: a few unknown)  



                                                  times a month           

 

           (unknown)  (no       (unknown)  (unknown)  and below  (units    (unkn

own)



                    date)                                   unknown)  

 

           (unknown)  (no       (unknown)  (unknown)  any chest pain no  (units 

   (unknown)



                    date)                         cough no rash no unknown)  



                                                  other symptoms.           

 

           (unknown)  (no       (unknown)  (unknown)  cyst rupture,  (units    (

unknown)



                    date)                         ectopic pregnancy, unknown)  



                                                  appendicitis.           



                                                  However I think           



                                                  all of these are           

 

           (unknown)  (no       (unknown)  (unknown)  did not vomit.  (units    

(unknown)



                    date)                         She had a normal unknown)  



                                                  day yesterday and           



                                                  went to bed last           



                                                  night. No           

 

           (unknown)  (no       (unknown)  (unknown)  guarding or  (units    (un

known)



                    date)                         rebound.  unknown)  

 

           (unknown)  (no       (unknown)  (unknown)  imaging needed.  (units   

 (unknown)



                    date)                                   unknown)  

 

           (unknown)  (no       (unknown)  (unknown)  intact    (units    (unkno

wn)



                    date)                                   unknown)  

 

           (unknown)  (no       (unknown)  (unknown)  it lasted for 30  (units  

  (unknown)



                    date)                         minutes. During unknown)  



                                                  the time she felt           



                                                  nauseous and short           



                                                  of. She             

 

           (unknown)  (no       (unknown)  (unknown)  lower abdominal  (units   

 (unknown)



                    date)                         pain all across unknown)  



                                                  her lower abdomen.           



                                                  She took 600 mg of           



                                                  ibuprofen           

 

           (unknown)  (no       (unknown)  (unknown)  membranes  (units    (unkn

own)



                    date)                                   unknown)  

 

           (unknown)  (no       (unknown)  (unknown)  overall   (units    (unkno

wn)



                    date)                         reassuring. unknown)  



                                                  However if you           



                                                  should have new           



                                                  worsening or           



                                                  persistent           

 

           (unknown)  (no       (unknown)  (unknown)  patient and  (units    (un

known)



                    date)                         partner. At this unknown)  



                                                  time I feel that           



                                                  there is no           



                                                  further testing or           

 

           (unknown)  (no       (unknown)  (unknown)  prior history of  (units  

  (unknown)



                    date)                         ovarian cyst. Her unknown)  



                                                  pain is completely           



                                                  resolved now. She           



                                                  denies              

 

           (unknown)  (no       (unknown)  (unknown)  relatively  (units    (unk

nown)



                    date)                         unlikely with how unknown)  



                                                  quickly symptoms           



                                                  resolved. I           



                                                  discussed this           



                                                  with                

 

           (unknown)  (no       (unknown)  (unknown)  sudden onset of  (units   

 (unknown)



                    date)                         severe abdominal unknown)  



                                                  pain lasting 30           



                                                  minutes resolving           



                                                  with Motrin.           

 

           (unknown)  (no       (unknown)  (unknown)  worsening or  (units    (u

nknown)



                    date)                         concerning unknown)  



                                                  symptoms            









                                         Result panel 6









           (unknown)  (no       (unknown)  (unknown)  (no value)  (units    (unk

nown)



                    date)                                   unknown)  

 

           (unknown)  (no       (unknown)  (unknown)  <Electronically  (units   

 (unknown)



                    date)                         signed by Paz Garcia D.O.>           

 

           (unknown)  (no       (unknown)  (unknown)  *Continue to take  (units 

   (unknown)



                    date)                         medications as unknown)  



                                                  directed            

 

           (unknown)  (no       (unknown)  (unknown)  *Follow up with  (units   

 (unknown)



                    date)                         your primary care unknown)  



                                                  provider in 2-3           



                                                  days or call           



                                                  [phone]           

 

           (unknown)  (no       (unknown)  (unknown)  *Return to ER if  (units  

  (unknown)



                    date)                         you should have unknown)  



                                                  increasing pain           



                                                  nausea vomiting or           



                                                  any new,            

 

           (unknown)  (no       (unknown)  (unknown)  *What to do: At  (units   

 (unknown)



                    date)                         this time unknown)  



                                                  abdominal pain           



                                                  that resolved           



                                                  within 30 minutes           



                                                  is                  

 

           (unknown)  (no       (unknown)  (unknown)  *You have been  (units    

(unknown)



                    date)                         diagnosed with unknown)  



                                                  abdominal pain           

 

           (unknown)  (no       (unknown)  (unknown)  23 0625  (units    (

unknown)



                    date)                                   unknown)  

 

           (unknown)  (no       (unknown)  (unknown)  23  (units    (unkno

wn)



                    date)                                   unknown)  

 

           (unknown)  (no       (unknown)  (unknown)  04:20 23  (units    

(unknown)



                    date)                                   unknown)  

 

           (unknown)  (no       (unknown)  (unknown)  05:33     (units    (unkno

wn)



                    date)                                   unknown)  

 

           (unknown)  (no       (unknown)  (unknown)  79035     (units    (unkno

wn)



                    date)                                   unknown)  

 

           (unknown)  (no       (unknown)  (unknown)  ABDOMEN: Soft,  (units    

(unknown)



                    date)                         nontender. unknown)  



                                                  Normoactive bowel           



                                                  sounds all 4           



                                                  quadrants. No           

 

           (unknown)  (no       (unknown)  (unknown)  Abdominal pain  (units    

(unknown)



                    date)                                   unknown)  

 

           (unknown)  (no       (unknown)  (unknown)  Activity  (units    (unkno

wn)



                    date)                         Restrictions/Addit unknown)  



                                                  ional               



                                                  Instructions:           

 

           (unknown)  (no       (unknown)  (unknown)  Age/Sex: 22 / F  (units   

 (unknown)



                    date)                                   unknown)  

 

           (unknown)  (no       (unknown)  (unknown)  Allergies  (units    (unkn

own)



                    date)                                   unknown)  

 

           (unknown)  (no       (unknown)  (unknown)  Allergy/AdvReac  (units   

 (unknown)



                    date)                         Type Severity unknown)  



                                                  Reaction Status           



                                                  Date / Time           

 

           (unknown)  (no       (unknown)  (unknown)  Bedside Urine  (units    (

unknown)



                    date)                         Bilirubin - unknown)  



                                                  Negative            

 

           (unknown)  (no       (unknown)  (unknown)  Bedside Urine  (units    (

unknown)



                    date)                         Glucose Negative unknown)  

 

           (unknown)  (no       (unknown)  (unknown)  Bedside Urine  (units    (

unknown)



                    date)                         Ketone - Negative unknown)  

 

           (unknown)  (no       (unknown)  (unknown)  Bedside Urine  (units    (

unknown)



                    date)                         Leukocytes - unknown)  



                                                  Negative            

 

           (unknown)  (no       (unknown)  (unknown)  Bedside Urine  (units    (

unknown)



                    date)                         Nitrite - Negative unknown)  

 

           (unknown)  (no       (unknown)  (unknown)  Bedside Urine  (units    (

unknown)



                    date)                         Occult Blood unknown)  

 

           (unknown)  (no       (unknown)  (unknown)  Bedside Urine  (units    (

unknown)



                    date)                         Protein - Negative unknown)  

 

           (unknown)  (no       (unknown)  (unknown)  Bedside Urine  (units    (

unknown)



                    date)                         Urobilinogen - unknown)  



                                                  Negative            

 

           (unknown)  (no       (unknown)  (unknown)  Bedside Urine pH  (units  

  (unknown)



                    date)                         6.0       unknown)  

 

           (unknown)  (no       (unknown)  (unknown)  Blood Pressure  (units    

(unknown)



                    date)                         121/77 23 unknown)  



                                                  04:20               

 

           (unknown)  (no       (unknown)  (unknown)  Blood Pressure  (units    

(unknown)



                    date)                          145/75 H unknown)  

 

           (unknown)  (no       (unknown)  (unknown)  CARDIOVASCULAR:  (units   

 (unknown)



                    date)                         Regular rate and unknown)  



                                                  rhythm without           



                                                  murmurs, rubs or           



                                                  gallops.            

 

           (unknown)  (no       (unknown)  (unknown)  Chief Complaint:  (units  

  (unknown)



                    date)                         Abdominal Pain unknown)  

 

           (unknown)  (no       (unknown)  (unknown)  Clinical  (units    (unkno

wn)



                    date)                         Impression: unknown)  

 

           (unknown)  (no       (unknown)  (unknown)  Course    (units    (unkno

wn)



                    date)                                   unknown)  

 

           (unknown)  (no       (unknown)  (unknown)  : 2000  (units   

 (unknown)



                    date)                         Acct:[id number] unknown)  

 

           (unknown)  (no       (unknown)  (unknown)  Date of Service:  (units  

  (unknown)



                    date)                         23  unknown)  

 

           (unknown)  (no       (unknown)  (unknown)  Departure  (units    (unkn

own)



                    date)                                   unknown)  

 

           (unknown)  (no       (unknown)  (unknown)  Discharge Plan  (units    

(unknown)



                    date)                                   unknown)  

 

           (unknown)  (no       (unknown)  (unknown)  ER Physician:  (units    (

unknown)



                    date)                         Paz Garcia unknown)  



                                                  D.O.                

 

           (unknown)  (no       (unknown)  (unknown)  EXTREMITIES:  (units    (u

nknown)



                    date)                         Normal range of unknown)  



                                                  motion, no           



                                                  clubbing or edema.           



                                                  Neurovascularly           

 

           (unknown)  (no       (unknown)  (unknown)  Emergency Report  (units  

  (unknown)



                    date)                                   unknown)  

 

           (unknown)  (no       (unknown)  (unknown)  Esterase  (units    (unkno

wn)



                    date)                                   unknown)  

 

           (unknown)  (no       (unknown)  (unknown)  Exam      (units    (unkno

wn)



                    date)                                   unknown)  

 

           (unknown)  (no       (unknown)  (unknown)  GENERAL: Alert  (units    

(unknown)



                    date)                         well-appearing unknown)  



                                                  22-year-old female           



                                                  and in no acute           



                                                  distress.           

 

           (unknown)  (no       (unknown)  (unknown)  General   (units    (unkno

wn)



                    date)                                   unknown)  

 

           (unknown)  (no       (unknown)  (unknown)  HEENT: Head  (units    (un

known)



                    date)                         atraumatic,EOMI, unknown)  



                                                  pupils reactive,           



                                                  face symmetric,           



                                                  moist mucous           

 

           (unknown)  (no       (unknown)  (unknown)  HPI - Abdominal  (units   

 (unknown)



                    date)                         Pain      unknown)  

 

           (unknown)  (no       (unknown)  (unknown)  HPI narrative:  (units    

(unknown)



                    date)                                   unknown)  

 

           (unknown)  (no       (unknown)  (unknown)  History of  (units    (unk

nown)



                    date)                         Present Illness unknown)  

 

           (unknown)  (no       (unknown)  (unknown)  Initial Vital  (units    (

unknown)



                    date)                         Signs     unknown)  

 

           (unknown)  (no       (unknown)  (unknown)  Initial Vital  (units    (

unknown)



                    date)                         Signs:    unknown)  

 

           (unknown)  (no       (unknown)  (unknown)  Instructions: DI  (units  

  (unknown)



                    date)                         for Abdominal unknown)  



                                                  Pain-Adult           

 

           (unknown)  (no       (unknown)  (unknown)  [facility]  (units   

 (unknown)



                    date)                         [address] [place] [address] unknown)  



                                                  [place], [place]           



                                                  [id number]               

 

           (unknown)  (no       (unknown)  (unknown)  Lab Data  (units    (unkno

wn)



                    date)                                   unknown)  

 

           (unknown)  (no       (unknown)  (unknown)  MDM - Abdominal  (units   

 (unknown)



                    date)                         Pain      unknown)  

 

           (unknown)  (no       (unknown)  (unknown)  MDM Narrative  (units    (

unknown)



                    date)                                   unknown)  

 

           (unknown)  (no       (unknown)  (unknown)  Medical decision  (units  

  (unknown)



                    date)                         making narrative: unknown)  

 

           (unknown)  (no       (unknown)  (unknown)  Mode of arrival:  (units  

  (unknown)



                    date)                         Ambulatory unknown)  

 

           (unknown)  (no       (unknown)  (unknown)  NEUROLOGICAL:  (units    (

unknown)



                    date)                         Alert and oriented unknown)  



                                                  x4.                 

 

           (unknown)  (no       (unknown)  (unknown)  No Known Drug  (units    (

unknown)



                    date)                         Allergies Allergy unknown)  



                                                  Verified 23           



                                                  04:35               

 

           (unknown)  (no       (unknown)  (unknown)  Oxygen Delivery  (units   

 (unknown)



                    date)                         Method 23 unknown)  



                                                  04:20               

 

           (unknown)  (no       (unknown)  (unknown)  Oxygen Delivery  (units   

 (unknown)



                    date)                         Method Room Air unknown)  



                                                  Room Air            

 

           (unknown)  (no       (unknown)  (unknown)  Patient   (units    (unkno

wn)



                    date)                         Disposition: Home unknown)  

 

           (unknown)  (no       (unknown)  (unknown)  Patient History  (units   

 (unknown)



                    date)                                   unknown)  

 

           (unknown)  (no       (unknown)  (unknown)  Patient is a  (units    (u

nknown)



                    date)                         healthy   unknown)  



                                                  22-year-old female           



                                                  who woke up at           



                                                  2:45 a.m. within           



                                                  can                 

 

           (unknown)  (no       (unknown)  (unknown)  Patient is a  (units    (u

nknown)



                    date)                         healthy   unknown)  



                                                  well-appearing           



                                                  22-year-old female           



                                                  presenting today           



                                                  with                

 

           (unknown)  (no       (unknown)  (unknown)  Patient:  (units    (unkno

wn)



                    date)                         Sean Sung MR#: unknown)  



                                                                 

 

           (unknown)  (no       (unknown)  (unknown)  Point of Care  (units    (

unknown)



                    date)                         Testing   unknown)  

 

           (unknown)  (no       (unknown)  (unknown)  Point of care  (units    (

unknown)



                    date)                         testing:  unknown)  

 

           (unknown)  (no       (unknown)  (unknown)  Pregnancy Test  (units    

(unknown)



                    date)                         Results Negative unknown)  

 

           (unknown)  (no       (unknown)  (unknown)  Provider,Min  (units  

  (unknown)



                    date)                         PRAKASH [[facility] unknown)  



                                                  Provider]           

 

           (unknown)  (no       (unknown)  (unknown)  Pulse Oximetry  (units    

(unknown)



                    date)                         100 23 04:20 unknown)  

 

           (unknown)  (no       (unknown)  (unknown)  Pulse Oximetry  (units    

(unknown)



                    date)                         100 98    unknown)  

 

           (unknown)  (no       (unknown)  (unknown)  Pulse Rate 68  (units    (

unknown)



                    date)                         23 04:20 unknown)  

 

           (unknown)  (no       (unknown)  (unknown)  Pulse Rate 68 76  (units  

  (unknown)



                    date)                                   unknown)  

 

           (unknown)  (no       (unknown)  (unknown)  RESPIRATORY:  (units    (u

nknown)



                    date)                         Breath sounds unknown)  



                                                  equal bilaterally,           



                                                  no wheezes rales           



                                                  or rhonchi.           

 

           (unknown)  (no       (unknown)  (unknown) ROS Unobtainable:  (units  

  (unknown)



                    date)                         All systems unknown)  



                                                  reviewed + are           



                                                  unremarkable           



                                                  except as noted in           



                                                  HPI                 

 

           (unknown)  (no       (unknown)  (unknown)  Referrals:  (units    (unk

nown)



                    date)                                   unknown)  

 

           (unknown)  (no       (unknown)  (unknown)  Related Data  (units    (u

nknown)



                    date)                                   unknown)  

 

           (unknown)  (no       (unknown)  (unknown)  Respiratory Rate  (units  

  (unknown)



                    date)                         18 23 04:20 unknown)  

 

           (unknown)  (no       (unknown)  (unknown)  Respiratory Rate  (units  

  (unknown)



                    date)                         18 18     unknown)  

 

           (unknown)  (no       (unknown)  (unknown)  Review of Systems  (units 

   (unknown)



                    date)                                   unknown)  

 

           (unknown)  (no       (unknown)  (unknown)  SKIN: Warm, dry,  (units  

  (unknown)



                    date)                         no laceration, no unknown)  



                                                  petechiae, no           



                                                  rashes or lesions.           

 

           (unknown)  (no       (unknown)  (unknown)  She is no  (units    (unkn

own)



                    date)                         recurrence of unknown)  



                                                  pain. She was           



                                                  previously within           



                                                  normal limits.           

 

           (unknown)  (no       (unknown)  (unknown)  Signed By:  (units    (unk

nown)



                    date)                                   unknown)  

 

           (unknown)  (no       (unknown)  (unknown)  Smoking Status:  (units   

 (unknown)



                    date)                         Never smoker unknown)  

 

           (unknown)  (no       (unknown)  (unknown)  Social History  (units    

(unknown)



                    date)                         (Reviewed 23 unknown)  



                                                  @ 05:19 by Paz Garcia DO)           

 

           (unknown)  (no       (unknown)  (unknown)  Source: patient  (units   

 (unknown)



                    date)                                   unknown)  

 

           (unknown)  (no       (unknown)  (unknown)  Stand Alone  (units    (un

known)



                    date)                         Forms: Patient unknown)  



                                                  Portal/API, Work           



                                                  Release Note           

 

           (unknown)  (no       (unknown)  (unknown)  Stated Complaint:  (units 

   (unknown)



                    date)                         ABD PAIN  unknown)  

 

           (unknown)  (no       (unknown)  (unknown)  Substance Use  (units    (

unknown)



                    date)                         Type: does not use unknown)  

 

           (unknown)  (no       (unknown)  (unknown)  Temperature 98.7  (units  

  (unknown)



                    date)                         F 23 04:20 unknown)  

 

           (unknown)  (no       (unknown)  (unknown)  Temperature 98.7  (units  

  (unknown)



                    date)                         F         unknown)  

 

           (unknown)  (no       (unknown)  (unknown)  Time Seen by  (units    (u

nknown)



                    date)                         Provider: 23 unknown)  



                                                  05:06               

 

           (unknown)  (no       (unknown)  (unknown)  Urinalysis and  (units    

(unknown)



                    date)                         pregnancy are unknown)  



                                                  negative.           



                                                  Differential           



                                                  diagnosis includes           



                                                  ovarian             

 

           (unknown)  (no       (unknown)  (unknown)  Urine Dip  (units    (unkn

own)



                    date)                                   unknown)  

 

           (unknown)  (no       (unknown)  (unknown)  Urine Specific  (units    

(unknown)



                    date)                         Gravity 1.020 unknown)  

 

           (unknown)  (no       (unknown)  (unknown)  Vital Signs - 8  (units   

 (unknown)



                    date)                         hr        unknown)  

 

           (unknown)  (no       (unknown)  (unknown)  Vital Signs  (units    (un

known)



                    date)                                   unknown)  

 

           (unknown)  (no       (unknown)  (unknown)  Vital signs:  (units    (u

nknown)



                    date)                                   unknown)  

 

           (unknown)  (no       (unknown)  (unknown)  abdominal pain  (units    

(unknown)



                    date)                         then please return unknown)  



                                                  to the emergency           



                                                  department           

 

           (unknown)  (no       (unknown)  (unknown)  alcohol intake  (units    

(unknown)



                    date)                         frequency: a few unknown)  



                                                  times a month           

 

           (unknown)  (no       (unknown)  (unknown)  and below  (units    (unkn

own)



                    date)                                   unknown)  

 

           (unknown)  (no       (unknown)  (unknown)  any chest pain no  (units 

   (unknown)



                    date)                         cough no rash no unknown)  



                                                  other symptoms.           

 

           (unknown)  (no       (unknown)  (unknown)  cyst rupture,  (units    (

unknown)



                    date)                         ectopic pregnancy, unknown)  



                                                  appendicitis.           



                                                  However I think           



                                                  all of these are           

 

           (unknown)  (no       (unknown)  (unknown)  did not vomit.  (units    

(unknown)



                    date)                         She had a normal unknown)  



                                                  day yesterday and           



                                                  went to bed last           



                                                  night. No           

 

           (unknown)  (no       (unknown)  (unknown)  guarding or  (units    (un

known)



                    date)                         rebound.  unknown)  

 

           (unknown)  (no       (unknown)  (unknown)  imaging needed.  (units   

 (unknown)



                    date)                                   unknown)  

 

           (unknown)  (no       (unknown)  (unknown)  intact    (units    (unkno

wn)



                    date)                                   unknown)  

 

           (unknown)  (no       (unknown)  (unknown)  it lasted for 30  (units  

  (unknown)



                    date)                         minutes. During unknown)  



                                                  the time she felt           



                                                  nauseous and short           



                                                  of. She             

 

           (unknown)  (no       (unknown)  (unknown)  lower abdominal  (units   

 (unknown)



                    date)                         pain all across unknown)  



                                                  her lower abdomen.           



                                                  She took 600 mg of           



                                                  ibuprofen           

 

           (unknown)  (no       (unknown)  (unknown)  membranes  (units    (unkn

own)



                    date)                                   unknown)  

 

           (unknown)  (no       (unknown)  (unknown)  overall   (units    (unkno

wn)



                    date)                         reassuring. unknown)  



                                                  However if you           



                                                  should have new           



                                                  worsening or           



                                                  persistent           

 

           (unknown)  (no       (unknown)  (unknown)  patient and  (units    (un

known)



                    date)                         partner. At this unknown)  



                                                  time I feel that           



                                                  there is no           



                                                  further testing or           

 

           (unknown)  (no       (unknown)  (unknown)  prior history of  (units  

  (unknown)



                    date)                         ovarian cyst. Her unknown)  



                                                  pain is completely           



                                                  resolved now. She           



                                                  denies              

 

           (unknown)  (no       (unknown)  (unknown)  relatively  (units    (unk

nown)



                    date)                         unlikely with how unknown)  



                                                  quickly symptoms           



                                                  resolved. I           



                                                  discussed this           



                                                  with                

 

           (unknown)  (no       (unknown)  (unknown)  sudden onset of  (units   

 (unknown)



                    date)                         severe abdominal unknown)  



                                                  pain lasting 30           



                                                  minutes resolving           



                                                  with Motrin.           

 

           (unknown)  (no       (unknown)  (unknown)  worsening or  (units    (u

nknown)



                    date)                         concerning unknown)  



                                                  symptoms            







Social History







                     date                description         facility

 

                     2023 00:00    Never smoked tobacco ([facility]







Vital Signs







                 date            measurement     value           units

 

                 2023 00:00  BP_diastolic    75              mmHg

 

                 2023 00:00  BP_systolic     145             mmHg

 

                 2023 00:00  heart_rate      76              /min

 

                 2023 00:00  o2_saturation   98              %

 

                 2023 00:00  respiration_rate  18              /min

 

                 2023 00:00  temperature_metric  37.06           C

 

                 2023 00:00  temperature_standard  98.7            F

 

                 2023 00:00  weight_metric   86.18           kg

 

                 2023 00:00  weight_standard  189.99          lb

## 2023-02-23 NOTE — ED PHYSICIAN DOCUMENTATION
PD HPI ABD PAIN





- Stated complaint


Stated Complaint: ABD PX/V/CONSTIPATED





- Chief complaint


Chief Complaint: Abd Pain





- History obtained from


History obtained from: Patient





- Additional information


Additional information: 





Otherwise healthy 22-year-old woman developed lower abdominal cramping starting 

3 weeks ago.  Its been episodic.  It started while on her menses, was severe for

about half an hour.  She went to another ED and had urine testing done but no 

other testing.  Subsequently followed up with her doctor and had urine and blood

done which were reportedly unremarkable.  She still has intermittent cramping.  

Its becoming more frequent.  It is in the low abdomen radiating across to both 

sides.  She had a menses on February 5 and subsequently also had some bleeding a

few days ago.  She attributed that to Depo-Provera withdrawal.  Last actual 

activity was 2 weeks ago and she is not too concerned about STDs.  She has not 

had a bowel movement in 5 days.  She has had nausea and vomiting for the last 2 

days.





PD PAST MEDICAL HISTORY





- Past Surgical History


Past Surgical History: Yes


HEENT: Tonsil/Adenoidectomy





- Present Medications


Home Medications: 


                                Ambulatory Orders











 Medication  Instructions  Recorded  Confirmed


 


Dextroamphetamine/Amphetamine 20 mg PO DAILY 02/23/23 02/23/23





[Adderall 20 mg Tablet]   


 


HYDROcod/ACETAM 5/325 [Norco 5/325] 1 - 2 tab PO Q6H PRN #15 tablet 02/23/23 


 


Ondansetron Odt [Zofran] 4 mg TL Q6H PRN #10 tablet 02/23/23 














- Allergies


Allergies/Adverse Reactions: 


                                    Allergies











Allergy/AdvReac Type Severity Reaction Status Date / Time


 


No Known Drug Allergies Allergy   Verified 02/23/23 15:52














- Social History


Does the pt smoke?: No


Smoking Status: Never smoker


Does the pt drink ETOH?: Yes


Does the pt have substance abuse?: No





- Immunizations


Immunizations are current?: Yes





PD ED PE NORMAL





- Vitals


Vital signs reviewed: Yes





- General


General: Alert and oriented X 3, No acute distress





- Cardiac


Cardiac: RRR, No murmur





- Respiratory


Respiratory: No respiratory distress, Clear bilaterally





- Abdomen


Abdomen: Normal bowel sounds, Soft, Non tender





- Female 


Female : Other (Kylee, tech present and chaperoning, dark blood in the vault, 

mild bimanual tenderness centrally and to the right adnexa without cervical 

motion tenderness.)





- Neuro


Neuro: Alert and oriented X 3, Normal speech





Results





- Vitals


Vitals: 


                               Vital Signs - 24 hr











  02/23/23 02/23/23





  15:47 17:52


 


Temperature 37.1 C 


 


Heart Rate 74 69


 


Respiratory 16 18





Rate  


 


Blood Pressure 123/94 H 109/83 H


 


O2 Saturation 100 100








                                     Oxygen











O2 Source                      Room air

















- Labs


Labs: 


                                Laboratory Tests











  02/23/23 02/23/23 02/23/23





  16:15 16:20 16:20


 


WBC   8.7 


 


RBC   4.14 L 


 


Hgb   12.3 


 


Hct   36.1 L 


 


MCV   87.2 


 


MCH   29.7 


 


MCHC   34.1 


 


RDW   13.0 


 


Plt Count   264 


 


MPV   10.5 


 


Neut # (Auto)   5.4 


 


Lymph # (Auto)   2.5 


 


Mono # (Auto)   0.8 


 


Eos # (Auto)   0.0 


 


Baso # (Auto)   0.0 


 


Absolute Nucleated RBC   0.00 


 


Nucleated RBC %   0.0 


 


Sodium    134 L


 


Potassium    3.5


 


Chloride    102


 


Carbon Dioxide    23


 


Anion Gap    9.0


 


BUN    10


 


Creatinine    0.7


 


Estimated GFR (MDRD)    125


 


Glucose    109 H


 


Calcium    9.2


 


Total Bilirubin    0.5


 


AST    24


 


ALT    15


 


Alkaline Phosphatase    38 L


 


Total Protein    7.9


 


Albumin    3.8


 


Globulin    4.1


 


Albumin/Globulin Ratio    0.9 L


 


Lipase    28


 


Urine Color  YELLOW  


 


Urine Clarity  CLEAR  


 


Urine pH  6.0  


 


Ur Specific Gravity  1.020  


 


Urine Protein  NEGATIVE  


 


Urine Glucose (UA)  NEGATIVE  


 


Urine Ketones  NEGATIVE  


 


Urine Occult Blood  SMALL H  


 


Urine Nitrite  NEGATIVE  


 


Urine Bilirubin  NEGATIVE  


 


Urine Urobilinogen  0.2 (NORMAL)  


 


Ur Leukocyte Esterase  NEGATIVE  


 


Urine RBC  0-5  


 


Urine WBC  0-3  


 


Ur Squamous Epith Cells  NONE SEEN  


 


Urine Bacteria  None Seen  


 


Ur Microscopic Review  INDICATED  


 


Urine Culture Comments  NOT INDICATED  


 


Urine HCG, Qual  NEGATIVE  














PD Medical Decision Making





- ED course


ED course: 





22-year-old woman has been dealing with pelvic cramps and odd vaginal bleeding 

for the last few weeks.  Stopped Depo-Provera about 5 months ago and that might 

be related but she has been on a birth control patch in the interim stopping 

about a week ago.  CT of the abdomen pelvis was negative.  CBC, CMP, urinalysis,

 and urine pregnancy test all without pertinent positive findings.  Feeling 

better after Toradol.  Pelvic exam done With some central bimanual and right 

adnexal tenderness.  Offered PID treatment but her exam was fairly benign for 

that and she preferred to watch and wait to see what her STD test show.





Departure





- Departure


Disposition: 01 Home, Self Care


Clinical Impression: 


Abdominal pain


Qualifiers:


 Abdominal location: lower abdomen, unspecified Qualified Code(s): R10.30 - 

Lower abdominal pain, unspecified





Condition: Good


Record reviewed to determine appropriate education?: Yes


Instructions:  ED Abdominal Pain Female Non-Specific Abdominal Pain


Prescriptions: 


HYDROcod/ACETAM 5/325 [Norco 5/325] 1 - 2 tab PO Q6H PRN #15 tablet


 PRN Reason: Pain


Ondansetron Odt [Zofran] 4 mg TL Q6H PRN #10 tablet


 PRN Reason: Nausea / Vomiting


Comments: 


Cause of your abdominal and pelvic pain is not quite clear, may be related to 

stoppage of the Depo-Provera.  STD tests are pending.  I sent some medication up

 to [facility] in [place] for you that should help with the pain and cramps.  

Return for new or worsening symptoms and follow-up with your doctor next week, 

consideration for gynecology referral.  The local civilian office is on this 

form but you probably will need a referral.





I am prescribing a short course of narcotic pain medication for you. These are 

potentially dangerous and addictive medications that should be used carefully.


These medications may constipate you. Take an over-the-counter stool softener 

(docusate) twice daily with plenty of water while taking these medications. If 

you go 24 hours without a bowel movement, take over-the-counter miralax, per 

package instructions.


Do not drink or drive while taking these medications.


If you received narcotic or sedating medications while in the emergency 

department, do not drive for 24 hours.


Store this medication in a safe, secure place and out of reach of children.


It is a violation of federal law to give or sell this medication to another 

person or to use in a manner other than prescribed.


The ED will not refill narcotic prescriptions, including prescriptions lost or 

stolen.


To dispose of unwanted medications:


1. [facility] at [address] in 

[place] has a medication drop box. They accept prescription medications (in 

pill form) Monday through Friday 9:00 a.m. to 5:00 p.m.


2. The [facility] at [place] [employer] accepts prescription medications (in

 pill form only) for disposal year round. Call [phone] for more 

information.


3. Contact the [facility] for the next [facility] sponsored prescription 

drug collection event. [phone], [phone], or [phone] 

[phone];


Note that many narcotic pain relievers also contain Tylenol/acetaminophen.  

Please ensure that your total dose of acetaminophen from all sources does not 

exceed 3 g (3000 mg) per day.





Forms:  Activity restrictions

## 2023-02-23 NOTE — CT REPORT
PROCEDURE:  ABDOMEN/PELVIS W

 

INDICATIONS:  IV only, low abd pain

 

CONTRAST: 100mL Omni 300 

 

TECHNIQUE:  

After the administration of IV contrast, 5 mm thick sections acquired from the diaphragms to the symp
hysis.  5 mm thick coronal and sagittal reformats were acquired.  For radiation dose reduction, the f
ollowing was used:  automated exposure control, adjustment of mA and/or kV according to patient size.
  

 

COMPARISON:  None

 

FINDINGS:  

Image quality: Good

 

Lower chest: The distal esophagus is patulous, with some fluid, nonspecific appearance. Lung bases ar
e unremarkable.

 

Solid organs: Suspected focal fat adjacent to falciform ligament. Liver is otherwise unremarkable. Ga
llbladder is underdistended. No pathologic dilation of the biliary tree or pancreatic duct. No spleno
megaly. No adrenal nodules. No hydronephrosis.

 

Vessels and lymph nodes: The main portal vein is patent. No abdominal aortic aneurysm. No pathologic 
adenopathy by size criteria.

 

Bowel and peritoneum: Esophagus findings as above. No evidence of small bowel obstruction. Small amou
nt pelvic free fluid may be physiologic. Normal appendix.

 

Body wall: Unremarkable.

 

Pelvis: Vaginal device. Unremarkable limited CT evaluation of the reproductive organs. Bladder is unr
emarkable.

 

Bones: No acute or suspicious osseous abnormality.

 

IMPRESSION:

No acute intra-abdominal pathology. Small amount of fluid, as well as patulous appearance of the dist
al esophagus can be associated with reflux. Consider fluoroscopic and/or endoscopic evaluation if nec
essary. Other findings as above. 

 

Reviewed by: Wyatt Khan MD on 2/23/2023 5:40 PM PST

Approved by: Wyatt Khan MD on 2/23/2023 5:40 PM PST

 

 

Station ID:  [id number]

## 2023-02-24 LAB
C TRACH DNA SPEC NAA+PROBE-ACNC: NEGATIVE
N GONORRHOEA DNA GENITAL QL NAA+PROBE: NEGATIVE
T VAGINALIS RRNA GENITAL QL PROBE: NEGATIVE

## 2023-04-17 ENCOUNTER — HOSPITAL ENCOUNTER (EMERGENCY)
Dept: HOSPITAL 76 - ED | Age: 23
Discharge: HOME | End: 2023-04-17
Payer: COMMERCIAL

## 2023-04-17 VITALS — DIASTOLIC BLOOD PRESSURE: 89 MMHG | SYSTOLIC BLOOD PRESSURE: 138 MMHG

## 2023-04-17 DIAGNOSIS — N92.6: Primary | ICD-10-CM

## 2023-04-17 LAB
CLARITY UR REFRACT.AUTO: CLEAR
GLUCOSE UR QL STRIP.AUTO: NEGATIVE MG/DL
HCG UR QL: NEGATIVE
KETONES UR QL STRIP.AUTO: NEGATIVE MG/DL
NITRITE UR QL STRIP.AUTO: NEGATIVE
PH UR STRIP.AUTO: 6 PH (ref 5–7.5)
PROT UR STRIP.AUTO-MCNC: NEGATIVE MG/DL
RBC # UR STRIP.AUTO: (no result) /UL
SP GR UR STRIP.AUTO: 1.02 (ref 1–1.03)
UROBILINOGEN UR QL STRIP.AUTO: (no result) E.U./DL
UROBILINOGEN UR STRIP.AUTO-MCNC: NEGATIVE MG/DL

## 2023-04-17 PROCEDURE — 81025 URINE PREGNANCY TEST: CPT

## 2023-04-17 PROCEDURE — 87086 URINE CULTURE/COLONY COUNT: CPT

## 2023-04-17 PROCEDURE — 81001 URINALYSIS AUTO W/SCOPE: CPT

## 2023-04-17 PROCEDURE — 99284 EMERGENCY DEPT VISIT MOD MDM: CPT

## 2023-04-17 PROCEDURE — 99283 EMERGENCY DEPT VISIT LOW MDM: CPT

## 2023-04-17 PROCEDURE — 81003 URINALYSIS AUTO W/O SCOPE: CPT

## 2023-04-17 NOTE — ED PHYSICIAN DOCUMENTATION
History of Present Illness





- Stated complaint


Stated Complaint: ABD PX





- Chief complaint


Chief Complaint: Abd Pain





- Additonal information


Additional information: 





22-year-old female presents emergency department for evaluation of left lower 

quadrant abdominal/pelvic pain and lack of the menstrual cycle for 2 months.  

Patient had previously been on Depo-Provera but stopped taking it in December 2022.  She reports that she had an irregular menstrual cycle in February of this

year but none since.  Since then she has had lower abdominal cramping and 

fullness.  Feels like she is going to start a menstrual cycle but never does.  

She denies any vaginal discharge.





Was seen in this emergency department in February for lower abdominal pain 

cramping.  CT at that time was negative.  STD testing was negative as well.





Last sexually active in February.  Reports that she had a negative pregnancy 

test 2 weeks ago.





Review of Systems


Constitutional: denies: Fever, Chills


GI: reports: Abdominal Pain, Reviewed and negative


: reports: Missed period, Other (No menstrual cycle since February 2023)


Skin: reports: Reviewed and negative





PD PAST MEDICAL HISTORY





- Past Surgical History


Past Surgical History: Yes


HEENT: Tonsil/Adenoidectomy





- Present Medications


Home Medications: 


                                Ambulatory Orders











 Medication  Instructions  Recorded  Confirmed


 


Dextroamphetamine/Amphetamine 20 mg PO DAILY 02/23/23 02/23/23





[Adderall 20 mg Tablet]   


 


HYDROcod/ACETAM 5/325 [Norco 5/325] 1 - 2 tab PO Q6H PRN #15 tablet 02/23/23 


 


Ondansetron Odt [Zofran] 4 mg TL Q6H PRN #10 tablet 02/23/23 














- Allergies


Allergies/Adverse Reactions: 


                                    Allergies











Allergy/AdvReac Type Severity Reaction Status Date / Time


 


No Known Drug Allergies Allergy   Verified 04/17/23 18:16














- Social History


Does the pt smoke?: No


Smoking Status: Never smoker


Does the pt drink ETOH?: Yes


Does the pt have substance abuse?: No





- Immunizations


Immunizations are current?: Yes





PD ED PE NORMAL





- General


General: Alert and oriented X 3, No acute distress





- Cardiac


Cardiac: RRR





- Respiratory


Respiratory: No respiratory distress, Clear bilaterally





- Abdomen


Abdomen: Normal bowel sounds, Soft.  No: Non tender (Unable to elicit any 

tenderness of the abdomen or pelvis with light or deep palpation.  No percussion

 tenderness.)





- Back


Back: No CVA TTP





- Derm


Derm: Normal color, Warm and dry, No rash





- Extremities


Extremities: No deformity





- Neuro


Neuro: Alert and oriented X 3





Results





- Vitals


Vitals: 


                               Vital Signs - 24 hr











  04/17/23 04/17/23





  18:16 18:20


 


Temperature 36.5 C 36.5 C


 


Heart Rate 86 86


 


Respiratory 16 16





Rate  


 


Blood Pressure 138/89 H 138/89 H


 


O2 Saturation 99 99








                                     Oxygen











O2 Source                      Room air

















- Labs


Labs: 


                                Laboratory Tests











  04/17/23





  18:36


 


Urine Color  YELLOW


 


Urine Clarity  CLEAR


 


Urine pH  6.0


 


Ur Specific Gravity  1.025


 


Urine Protein  NEGATIVE


 


Urine Glucose (UA)  NEGATIVE


 


Urine Ketones  NEGATIVE


 


Urine Occult Blood  TRACE-INTA


 


Urine Nitrite  NEGATIVE


 


Urine Bilirubin  NEGATIVE


 


Urine Urobilinogen  0.2 (NORMAL)


 


Ur Leukocyte Esterase  NEGATIVE


 


Ur Microscopic Review  NOT INDICATED


 


Urine Culture Comments  NOT INDICATED


 


Urine HCG, Qual  NEGATIVE














PD Medical Decision Making





- ED course


Complexity details: reviewed results, re-evaluated patient, considered 

differential, d/w patient


ED course: 





22-year-old female presents emergency department for evaluation of left lower 

pelvic pain.  This has been ongoing for a number of months.  She has not had a 

menstrual cycle since mid February.  She is previously on Depo-Provera for birth

 control but stopped in December 2022.  Seen in this ER and later February had 

an unremarkable CT scan.  At that time STI testing was negative.





On exam today the patient appears rather well.  I was unable to elicit any 

abdominal tenderness.  Her urinalysis per my interpretation showed no signs of 

infections.  Patient is not pregnant.  I did offer a pelvic ultrasound to 

evaluate for the possibility of fibroids or ovarian cyst that may be 

contributing to her lack of menstrual cycle but when patient was informed that 

the wait time would likely be 2 to 3 hours for imaging she declined to wait and 

requested discharge home.  She states that she is satisfied knowing she is not 

positive.  I encouraged [facility] to [facility] 

evaluation of what appears to now be your regular menstrual cycles.





Departure





- Departure


Disposition: 01 Home, Self Care


Clinical Impression: 


 Irregular periods/menstrual cycles





Condition: Stable


Comments: 


As we discussed at the bedside your urinalysis today shows no signs of 

infection.  You are not pregnant.  We did offer a pelvic ultrasound to evaluate 

for possible causes of the irregular menstrual cycle to include uterine fibroids

 or ovarian cysts.  However you have declined to wait for imaging here in the 

ER.  At this time it is okay for you to follow closely with [facility].  I 

would recommend a pelvic ultrasound as an outpatient.  Return to the ER if you 

have worsening symptoms, fevers uncontrolled vomiting or severe abdominal pain.

## 2023-04-17 NOTE — EXTERNAL MEDICAL SUMMARY RPT
Continuity of Care Document

                            Created on:2023



Patient:Sean Sung

Sex:Female

:2000

External Reference #:[id number]





Demographics







                          Address                   [address] [place] [address] 



                                                    [facility]

 

                          Phone                     Unavailable

 

                          Preferred Language        English

 

                          Marital Status            Never 

 

                          Congregational Affiliation     Unknown

 

                          Race                      Unknown

 

                          Ethnic Group              Unknown









Author







                          Organization              [facility]

 

                          Address                   [address] [place] [address]

 

                          Phone                     [phone]









Care Team Providers







                    Name                Role                Phone

 

                    Paz Garcia     Unavailable         Unavailable









Allergies and Intolerances







                 date            description     facility        type

 

                 (no date)       No Known Drug Allergies  [facility]  (unkn

own)







Encounters

No information.



Functional Status

No information.



Immunizations

No information.



Medications

No information.



Problems







                     date                description         facility

 

                     2023 00:00    Abdominal pain      [facility]







Procedures

No information.



Results/Labs







           test      date      author    facility  value     unit      interpret

ation









                                         Result panel 1









           (unknown)  (no       (unknown)  (unknown)  (no value)  (units    (unk

nown)



                    date)                                   unknown)  

 

           (unknown)  (no       (unknown)  (unknown)  23  (units    (unkno

wn)



                    date)                                   unknown)  

 

           (unknown)  (no       (unknown)  (unknown)  04:20     (units    (unkno

wn)



                    date)                                   unknown)  

 

           (unknown)  (no       (unknown)  (unknown)  [id number]     (units    (unkno

wn)



                    date)                                   unknown)  

 

           (unknown)  (no       (unknown)  (unknown)  Age/Sex: 22 / F  (units   

 (unknown)



                    date)                                   unknown)  

 

           (unknown)  (no       (unknown)  (unknown)  Allergies  (units    (unkn

own)



                    date)                                   unknown)  

 

           (unknown)  (no       (unknown)  (unknown)  Allergy/AdvReac  (units   

 (unknown)



                    date)                         Type Severity unknown)  



                                                  Reaction Status           



                                                  Date / Time           

 

           (unknown)  (no       (unknown)  (unknown)  Bedside Urine  (units    (

unknown)



                    date)                         Bilirubin - unknown)  



                                                  Negative            

 

           (unknown)  (no       (unknown)  (unknown)  Bedside Urine  (units    (

unknown)



                    date)                         Glucose Negative unknown)  

 

           (unknown)  (no       (unknown)  (unknown)  Bedside Urine  (units    (

unknown)



                    date)                         Ketone -  unknown)  



                                                  Negative            

 

           (unknown)  (no       (unknown)  (unknown)  Bedside Urine  (units    (

unknown)



                    date)                         Leukocytes - unknown)  



                                                  Negative            

 

           (unknown)  (no       (unknown)  (unknown)  Bedside Urine  (units    (

unknown)



                    date)                         Nitrite - unknown)  



                                                  Negative            

 

           (unknown)  (no       (unknown)  (unknown)  Bedside Urine  (units    (

unknown)



                    date)                         Occult Blood unknown)  

 

           (unknown)  (no       (unknown)  (unknown)  Bedside Urine  (units    (

unknown)



                    date)                         Protein - unknown)  



                                                  Negative            

 

           (unknown)  (no       (unknown)  (unknown)  Bedside Urine  (units    (

unknown)



                    date)                         Urobilinogen - unknown)  



                                                  Negative            

 

           (unknown)  (no       (unknown)  (unknown)  Bedside Urine  (units    (

unknown)



                    date)                         pH 6.0    unknown)  

 

           (unknown)  (no       (unknown)  (unknown)  Blood Pressure  (units    

(unknown)



                    date)                         121/77 23 unknown)  



                                                  04:20               

 

           (unknown)  (no       (unknown)  (unknown)  Blood Pressure  (units    

(unknown)



                    date)                             unknown)  

 

           (unknown)  (no       (unknown)  (unknown)  Chief     (units    (unkno

wn)



                    date)                         Complaint: unknown)  



                                                  Abdominal Pain           

 

           (unknown)  (no       (unknown)  (unknown)  Course    (units    (unkno

wn)



                    date)                                   unknown)  

 

           (unknown)  (no       (unknown)  (unknown)  : 2000  (units   

 (unknown)



                    date)                         Acct:[id number] unknown)  

 

           (unknown)  (no       (unknown)  (unknown)  Date of   (units    (unkno

wn)



                    date)                         Service:  unknown)  



                                                  23            

 

           (unknown)  (no       (unknown)  (unknown)  Departure  (units    (unkn

own)



                    date)                                   unknown)  

 

           (unknown)  (no       (unknown)  (unknown)  Discharge Plan  (units    

(unknown)



                    date)                                   unknown)  

 

           (unknown)  (no       (unknown)  (unknown)  ER Physician:  (units    (

unknown)



                    date)                         Paz Garcia unknown)  



                                                  D.O.                

 

           (unknown)  (no       (unknown)  (unknown)  Emergency  (units    (unkn

own)



                    date)                         Report    unknown)  

 

           (unknown)  (no       (unknown)  (unknown)  Esterase  (units    (unkno

wn)



                    date)                                   unknown)  

 

           (unknown)  (no       (unknown)  (unknown)  Exam      (units    (unkno

wn)



                    date)                                   unknown)  

 

           (unknown)  (no       (unknown)  (unknown)  General   (units    (unkno

wn)



                    date)                                   unknown)  

 

           (unknown)  (no       (unknown)  (unknown)  HPI - Abdominal  (units   

 (unknown)



                    date)                         Pain      unknown)  

 

           (unknown)  (no       (unknown)  (unknown)  Initial Vital  (units    (

unknown)



                    date)                         Signs     unknown)  

 

           (unknown)  (no       (unknown)  (unknown)  Initial Vital  (units    (

unknown)



                    date)                         Signs:    unknown)  

 

           (unknown)  (no       (unknown)  (unknown)  [facility]  (units   

 (unknown)



                    date)                         [address] unknown)  



                                                  [place]           



                                                  [address]               

 

           (unknown)  (no       (unknown)  (unknown)  Lab Data  (units    (unkno

wn)



                    date)                                   unknown)  

 

           (unknown)  (no       (unknown)  (unknown)  MDM - Abdominal  (units   

 (unknown)



                    date)                         Pain      unknown)  

 

           (unknown)  (no       (unknown)  (unknown)  Mode of   (units    (unkno

wn)



                    date)                         arrival:  unknown)  



                                                  Ambulatory           

 

           (unknown)  (no       (unknown)  (unknown)  No Known Drug  (units    (

unknown)



                    date)                         Allergies unknown)  



                                                  Allergy Verified           



                                                  23 04:35           

 

           (unknown)  (no       (unknown)  (unknown)  Oxygen Delivery  (units   

 (unknown)



                    date)                         Method 23 unknown)  



                                                  04:20               

 

           (unknown)  (no       (unknown)  (unknown)  Oxygen Delivery  (units   

 (unknown)



                    date)                         Method Room Air unknown)  

 

           (unknown)  (no       (unknown)  (unknown)  Patient History  (units   

 (unknown)



                    date)                                   unknown)  

 

           (unknown)  (no       (unknown)  (unknown)  Patient:  (units    (unkno

wn)



                    date)                         Sean Sung unknown)  



                                                  MR#:            

 

           (unknown)  (no       (unknown)  (unknown)  Point of Care  (units    (

unknown)



                    date)                         Testing   unknown)  

 

           (unknown)  (no       (unknown)  (unknown)  Point of care  (units    (

unknown)



                    date)                         testing:  unknown)  

 

           (unknown)  (no       (unknown)  (unknown)  Pregnancy Test  (units    

(unknown)



                    date)                         Results Negative unknown)  

 

           (unknown)  (no       (unknown)  (unknown)  Provider,Lucila  (units   

 (unknown)



                    date)                         y PRAKASH [Primary unknown)  



                                                  Care Provider]           

 

           (unknown)  (no       (unknown)  (unknown)  Pulse Oximetry  (units    

(unknown)



                    date)                         100 23 unknown)  



                                                  04:20               

 

           (unknown)  (no       (unknown)  (unknown)  Pulse Oximetry  (units    

(unknown)



                    date)                         100       unknown)  

 

           (unknown)  (no       (unknown)  (unknown)  Pulse Rate 68  (units    (

unknown)



                    date)                         23 04:20 unknown)  

 

           (unknown)  (no       (unknown)  (unknown)  Pulse Rate 68  (units    (

unknown)



                    date)                                   unknown)  

 

           (unknown)  (no       (unknown)  (unknown)  Referrals:  (units    (unk

nown)



                    date)                                   unknown)  

 

           (unknown)  (no       (unknown)  (unknown)  Related Data  (units    (u

nknown)



                    date)                                   unknown)  

 

           (unknown)  (no       (unknown)  (unknown)  Respiratory  (units    (un

known)



                    date)                         Rate 18 23 unknown)  



                                                  04:20               

 

           (unknown)  (no       (unknown)  (unknown)  Respiratory  (units    (un

known)



                    date)                         Rate 18   unknown)  

 

           (unknown)  (no       (unknown)  (unknown)  Signed By:  (units    (unk

nown)



                    date)                                   unknown)  

 

           (unknown)  (no       (unknown)  (unknown)  Smoking Status:  (units   

 (unknown)



                    date)                         Never smoker unknown)  

 

           (unknown)  (no       (unknown)  (unknown)  Social History  (units    

(unknown)



                    date)                                   unknown)  

 

           (unknown)  (no       (unknown)  (unknown)  Source: patient  (units   

 (unknown)



                    date)                                   unknown)  

 

           (unknown)  (no       (unknown)  (unknown)  Stated    (units    (unkno

wn)



                    date)                         Complaint: ABD unknown)  



                                                  PAIN                

 

           (unknown)  (no       (unknown)  (unknown)  Substance Use  (units    (

unknown)



                    date)                         Type: does not unknown)  



                                                  use                 

 

           (unknown)  (no       (unknown)  (unknown)  Temperature  (units    (un

known)



                    date)                         98.7 F 23 unknown)  



                                                  04:20               

 

           (unknown)  (no       (unknown)  (unknown)  Temperature  (units    (un

known)



                    date)                         98.7 F    unknown)  

 

           (unknown)  (no       (unknown)  (unknown)  Time Seen by  (units    (u

nknown)



                    date)                         Provider: unknown)  



                                                  23 05:06           

 

           (unknown)  (no       (unknown)  (unknown)  Urine Dip  (units    (unkn

own)



                    date)                                   unknown)  

 

           (unknown)  (no       (unknown)  (unknown)  Urine Specific  (units    

(unknown)



                    date)                         Gravity 1.020 unknown)  

 

           (unknown)  (no       (unknown)  (unknown)  Vital Signs - 8  (units   

 (unknown)



                    date)                         hr        unknown)  

 

           (unknown)  (no       (unknown)  (unknown)  Vital Signs  (units    (un

known)



                    date)                                   unknown)  

 

           (unknown)  (no       (unknown)  (unknown)  Vital signs:  (units    (u

nknown)



                    date)                                   unknown)  

 

           (unknown)  (no       (unknown)  (unknown)  alcohol intake  (units    

(unknown)



                    date)                         frequency: a few unknown)  



                                                  times a month           









                                         Result panel 2









           (unknown)  (no       (unknown)  (unknown)  (no value)  (units    (unk

nown)



                    date)                                   unknown)  

 

           (unknown)  (no       (unknown)  (unknown)  23  (units    (unkno

wn)



                    date)                                   unknown)  

 

           (unknown)  (no       (unknown)  (unknown)  04:20     (units    (unkno

wn)



                    date)                                   unknown)  

 

           (unknown)  (no       (unknown)  (unknown)  [address]     (units    (unkno

wn)



                    date)                                   unknown)  

 

           (unknown)  (no       (unknown)  (unknown)  Age/Sex: 22 / F  (units   

 (unknown)



                    date)                                   unknown)  

 

           (unknown)  (no       (unknown)  (unknown)  Allergies  (units    (unkn

own)



                    date)                                   unknown)  

 

           (unknown)  (no       (unknown)  (unknown)  Allergy/AdvReac  (units   

 (unknown)



                    date)                         Type Severity unknown)  



                                                  Reaction Status           



                                                  Date / Time           

 

           (unknown)  (no       (unknown)  (unknown)  Bedside Urine  (units    (

unknown)



                    date)                         Bilirubin - unknown)  



                                                  Negative            

 

           (unknown)  (no       (unknown)  (unknown)  Bedside Urine  (units    (

unknown)



                    date)                         Glucose Negative unknown)  

 

           (unknown)  (no       (unknown)  (unknown)  Bedside Urine  (units    (

unknown)



                    date)                         Ketone -  unknown)  



                                                  Negative            

 

           (unknown)  (no       (unknown)  (unknown)  Bedside Urine  (units    (

unknown)



                    date)                         Leukocytes - unknown)  



                                                  Negative            

 

           (unknown)  (no       (unknown)  (unknown)  Bedside Urine  (units    (

unknown)



                    date)                         Nitrite - unknown)  



                                                  Negative            

 

           (unknown)  (no       (unknown)  (unknown)  Bedside Urine  (units    (

unknown)



                    date)                         Occult Blood unknown)  

 

           (unknown)  (no       (unknown)  (unknown)  Bedside Urine  (units    (

unknown)



                    date)                         Protein - unknown)  



                                                  Negative            

 

           (unknown)  (no       (unknown)  (unknown)  Bedside Urine  (units    (

unknown)



                    date)                         Urobilinogen - unknown)  



                                                  Negative            

 

           (unknown)  (no       (unknown)  (unknown)  Bedside Urine  (units    (

unknown)



                    date)                         pH 6.0    unknown)  

 

           (unknown)  (no       (unknown)  (unknown)  Blood Pressure  (units    

(unknown)



                    date)                         121/77 23 unknown)  



                                                  04:20               

 

           (unknown)  (no       (unknown)  (unknown)  Blood Pressure  (units    

(unknown)



                    date)                             unknown)  

 

           (unknown)  (no       (unknown)  (unknown)  Chief     (units    (unkno

wn)



                    date)                         Complaint: unknown)  



                                                  Abdominal Pain           

 

           (unknown)  (no       (unknown)  (unknown)  Course    (units    (unkno

wn)



                    date)                                   unknown)  

 

           (unknown)  (no       (unknown)  (unknown)  : 2000  (units   

 (unknown)



                    date)                         Acct:[id number] unknown)  

 

           (unknown)  (no       (unknown)  (unknown)  Date of   (units    (unkno

wn)



                    date)                         Service:  unknown)  



                                                  23            

 

           (unknown)  (no       (unknown)  (unknown)  Departure  (units    (unkn

own)



                    date)                                   unknown)  

 

           (unknown)  (no       (unknown)  (unknown)  Discharge Plan  (units    

(unknown)



                    date)                                   unknown)  

 

           (unknown)  (no       (unknown)  (unknown)  ER Physician:  (units    (

unknown)



                    date)                         Paz Garcia unknown)  



                                                  D.O.                

 

           (unknown)  (no       (unknown)  (unknown)  Emergency  (units    (unkn

own)



                    date)                         Report    unknown)  

 

           (unknown)  (no       (unknown)  (unknown)  Esterase  (units    (unkno

wn)



                    date)                                   unknown)  

 

           (unknown)  (no       (unknown)  (unknown)  Exam      (units    (unkno

wn)



                    date)                                   unknown)  

 

           (unknown)  (no       (unknown)  (unknown)  General   (units    (unkno

wn)



                    date)                                   unknown)  

 

           (unknown)  (no       (unknown)  (unknown)  HPI - Abdominal  (units   

 (unknown)



                    date)                         Pain      unknown)  

 

           (unknown)  (no       (unknown)  (unknown)  HPI narrative:  (units    

(unknown)



                    date)                                   unknown)  

 

           (unknown)  (no       (unknown)  (unknown)  History of  (units    (unk

nown)



                    date)                         Present Illness unknown)  

 

           (unknown)  (no       (unknown)  (unknown)  Initial Vital  (units    (

unknown)



                    date)                         Signs     unknown)  

 

           (unknown)  (no       (unknown)  (unknown)  Initial Vital  (units    (

unknown)



                    date)                         Signs:    unknown)  

 

           (unknown)  (no       (unknown)  (unknown)  [facility]  (units   

 (unknown)



                    date)                         [address] unknown)  



                                                  [place]           



                                                  [address]               

 

           (unknown)  (no       (unknown)  (unknown)  Lab Data  (units    (unkno

wn)



                    date)                                   unknown)  

 

           (unknown)  (no       (unknown)  (unknown)  MDM - Abdominal  (units   

 (unknown)



                    date)                         Pain      unknown)  

 

           (unknown)  (no       (unknown)  (unknown)  Mode of   (units    (unkno

wn)



                    date)                         arrival:  unknown)  



                                                  Ambulatory           

 

           (unknown)  (no       (unknown)  (unknown)  No Known Drug  (units    (

unknown)



                    date)                         Allergies unknown)  



                                                  Allergy Verified           



                                                  23 04:35           

 

           (unknown)  (no       (unknown)  (unknown)  Oxygen Delivery  (units   

 (unknown)



                    date)                         Method 23 unknown)  



                                                  04:20               

 

           (unknown)  (no       (unknown)  (unknown)  Oxygen Delivery  (units   

 (unknown)



                    date)                         Method Room Air unknown)  

 

           (unknown)  (no       (unknown)  (unknown)  Patient History  (units   

 (unknown)



                    date)                                   unknown)  

 

           (unknown)  (no       (unknown)  (unknown)  Patient is a  (units    (u

nknown)



                    date)                         healthy   unknown)  



                                                  22-year-old           



                                                  female who woke           



                                                  up at 2:45 a.m.           



                                                  within can           

 

           (unknown)  (no       (unknown)  (unknown)  Patient:  (units    (unkno

wn)



                    date)                         Sean Sung unknown)  



                                                  MR#:            

 

           (unknown)  (no       (unknown)  (unknown)  Point of Care  (units    (

unknown)



                    date)                         Testing   unknown)  

 

           (unknown)  (no       (unknown)  (unknown)  Point of care  (units    (

unknown)



                    date)                         testing:  unknown)  

 

           (unknown)  (no       (unknown)  (unknown)  Pregnancy Test  (units    

(unknown)



                    date)                         Results Negative unknown)  

 

           (unknown)  (no       (unknown)  (unknown)  Provider,Maniidbe  (units   

 (unknown)



                    date)                         y PRAKASH [Primary unknown)  



                                                  Care Provider]           

 

           (unknown)  (no       (unknown)  (unknown)  Pulse Oximetry  (units    

(unknown)



                    date)                         100 23 unknown)  



                                                  04:20               

 

           (unknown)  (no       (unknown)  (unknown)  Pulse Oximetry  (units    

(unknown)



                    date)                         100       unknown)  

 

           (unknown)  (no       (unknown)  (unknown)  Pulse Rate 68  (units    (

unknown)



                    date)                         23 04:20 unknown)  

 

           (unknown)  (no       (unknown)  (unknown)  Pulse Rate 68  (units    (

unknown)



                    date)                                   unknown)  

 

           (unknown)  (no       (unknown)  (unknown) ROS        (units    (unkno

wn)



                    date)                         Unobtainable: unknown)  



                                                  All systems           



                                                  reviewed + are           



                                                  unremarkable           



                                                  except as noted           



                                                  in HPI              

 

           (unknown)  (no       (unknown)  (unknown)  Referrals:  (units    (unk

nown)



                    date)                                   unknown)  

 

           (unknown)  (no       (unknown)  (unknown)  Related Data  (units    (u

nknown)



                    date)                                   unknown)  

 

           (unknown)  (no       (unknown)  (unknown)  Respiratory  (units    (un

known)



                    date)                         Rate 18 23 unknown)  



                                                  04:20               

 

           (unknown)  (no       (unknown)  (unknown)  Respiratory  (units    (un

known)



                    date)                         Rate 18   unknown)  

 

           (unknown)  (no       (unknown)  (unknown)  Review of  (units    (unkn

own)



                    date)                         Systems   unknown)  

 

           (unknown)  (no       (unknown)  (unknown)  Signed By:  (units    (unk

nown)



                    date)                                   unknown)  

 

           (unknown)  (no       (unknown)  (unknown)  Smoking Status:  (units   

 (unknown)



                    date)                         Never smoker unknown)  

 

           (unknown)  (no       (unknown)  (unknown)  Social History  (units    

(unknown)



                    date)                         (Reviewed unknown)  



                                                  23 @ 05:19           



                                                  by Paz Garcia DO)           

 

           (unknown)  (no       (unknown)  (unknown)  Source: patient  (units   

 (unknown)



                    date)                                   unknown)  

 

           (unknown)  (no       (unknown)  (unknown)  Stated    (units    (unkno

wn)



                    date)                         Complaint: ABD unknown)  



                                                  PAIN                

 

           (unknown)  (no       (unknown)  (unknown)  Substance Use  (units    (

unknown)



                    date)                         Type: does not unknown)  



                                                  use                 

 

           (unknown)  (no       (unknown)  (unknown)  Temperature  (units    (un

known)



                    date)                         98.7 F 23 unknown)  



                                                  04:20               

 

           (unknown)  (no       (unknown)  (unknown)  Temperature  (units    (un

known)



                    date)                         98.7 F    unknown)  

 

           (unknown)  (no       (unknown)  (unknown)  Time Seen by  (units    (u

nknown)



                    date)                         Provider: unknown)  



                                                  23 05:06           

 

           (unknown)  (no       (unknown)  (unknown)  Urine Dip  (units    (unkn

own)



                    date)                                   unknown)  

 

           (unknown)  (no       (unknown)  (unknown)  Urine Specific  (units    

(unknown)



                    date)                         Gravity 1.020 unknown)  

 

           (unknown)  (no       (unknown)  (unknown)  Vital Signs - 8  (units   

 (unknown)



                    date)                         hr        unknown)  

 

           (unknown)  (no       (unknown)  (unknown)  Vital Signs  (units    (un

known)



                    date)                                   unknown)  

 

           (unknown)  (no       (unknown)  (unknown)  Vital signs:  (units    (u

nknown)



                    date)                                   unknown)  

 

           (unknown)  (no       (unknown)  (unknown)  alcohol intake  (units    

(unknown)



                    date)                         frequency: a few unknown)  



                                                  times a month           

 

           (unknown)  (no       (unknown)  (unknown)  and below  (units    (unkn

own)



                    date)                                   unknown)  

 

           (unknown)  (no       (unknown)  (unknown)  completely  (units    (unk

nown)



                    date)                         resolved now. unknown)  

 

           (unknown)  (no       (unknown)  (unknown)  did not vomit.  (units    

(unknown)



                    date)                         He would a bowel unknown)  



                                                  movement. She           



                                                  had a normal day           



                                                  yesterday and           

 

           (unknown)  (no       (unknown)  (unknown)  it lasted for  (units    (

unknown)



                    date)                         30 minutes. unknown)  



                                                  During the time           



                                                  she felt            



                                                  nauseous and           



                                                  short of. She           

 

           (unknown)  (no       (unknown)  (unknown)  lower abdominal  (units   

 (unknown)



                    date)                         pain all across unknown)  



                                                  her lower           



                                                  abdomen. She           



                                                  took 600 mg of           



                                                  ibuprofen           

 

           (unknown)  (no       (unknown)  (unknown)  went to bed  (units    (un

known)



                    date)                         last night. No unknown)  



                                                  prior history of           



                                                  ovarian cyst.           



                                                  Her pain is           









                                         Result panel 3









           (unknown)  (no       (unknown)  (unknown)  (no value)  (units    (unk

nown)



                    date)                                   unknown)  

 

           (unknown)  (no       (unknown)  (unknown)  *Continue to  (units    (u

nknown)



                    date)                         take medications unknown)  



                                                  as directed           

 

           (unknown)  (no       (unknown)  (unknown)  *Follow up with  (units   

 (unknown)



                    date)                         your primary unknown)  



                                                  care provider in           



                                                  2-3 days or call           



                                                  [phone]           

 

           (unknown)  (no       (unknown)  (unknown)  *Return to ER  (units    (

unknown)



                    date)                         if you should unknown)  



                                                  have increasing           



                                                  pain nausea           



                                                  vomiting or any           



                                                  new,                

 

           (unknown)  (no       (unknown)  (unknown)  *What to do: At  (units   

 (unknown)



                    date)                         this time unknown)  



                                                  abdominal pain           



                                                  that resolved           



                                                  within 30           



                                                  minutes is           

 

           (unknown)  (no       (unknown)  (unknown)  *You have been  (units    

(unknown)



                    date)                         diagnosed with unknown)  



                                                  abdominal pain           

 

           (unknown)  (no       (unknown)  (unknown)  23  (units    (unkno

wn)



                    date)                                   unknown)  

 

           (unknown)  (no       (unknown)  (unknown)  04:20     (units    (unkno

wn)



                    date)                                   unknown)  

 

           (unknown)  (no       (unknown)  (unknown)  [id number]     (units    (unkno

wn)



                    date)                                   unknown)  

 

           (unknown)  (no       (unknown)  (unknown)  Abdominal pain  (units    

(unknown)



                    date)                                   unknown)  

 

           (unknown)  (no       (unknown)  (unknown)  Activity  (units    (unkno

wn)



                    date)                         Restrictions/Add unknown)  



                                                  itional             



                                                  Instructions:           

 

           (unknown)  (no       (unknown)  (unknown)  Age/Sex: 22 / F  (units   

 (unknown)



                    date)                                   unknown)  

 

           (unknown)  (no       (unknown)  (unknown)  Allergies  (units    (unkn

own)



                    date)                                   unknown)  

 

           (unknown)  (no       (unknown)  (unknown)  Allergy/AdvReac  (units   

 (unknown)



                    date)                         Type Severity unknown)  



                                                  Reaction Status           



                                                  Date / Time           

 

           (unknown)  (no       (unknown)  (unknown)  Bedside Urine  (units    (

unknown)



                    date)                         Bilirubin - unknown)  



                                                  Negative            

 

           (unknown)  (no       (unknown)  (unknown)  Bedside Urine  (units    (

unknown)



                    date)                         Glucose Negative unknown)  

 

           (unknown)  (no       (unknown)  (unknown)  Bedside Urine  (units    (

unknown)



                    date)                         Ketone -  unknown)  



                                                  Negative            

 

           (unknown)  (no       (unknown)  (unknown)  Bedside Urine  (units    (

unknown)



                    date)                         Leukocytes - unknown)  



                                                  Negative            

 

           (unknown)  (no       (unknown)  (unknown)  Bedside Urine  (units    (

unknown)



                    date)                         Nitrite - unknown)  



                                                  Negative            

 

           (unknown)  (no       (unknown)  (unknown)  Bedside Urine  (units    (

unknown)



                    date)                         Occult Blood unknown)  

 

           (unknown)  (no       (unknown)  (unknown)  Bedside Urine  (units    (

unknown)



                    date)                         Protein - unknown)  



                                                  Negative            

 

           (unknown)  (no       (unknown)  (unknown)  Bedside Urine  (units    (

unknown)



                    date)                         Urobilinogen - unknown)  



                                                  Negative            

 

           (unknown)  (no       (unknown)  (unknown)  Bedside Urine  (units    (

unknown)



                    date)                         pH 6.0    unknown)  

 

           (unknown)  (no       (unknown)  (unknown)  Blood Pressure  (units    

(unknown)



                    date)                         121/77 23 unknown)  



                                                  04:20               

 

           (unknown)  (no       (unknown)  (unknown)  Blood Pressure  (units    

(unknown)



                    date)                             unknown)  

 

           (unknown)  (no       (unknown)  (unknown)  Chief     (units    (unkno

wn)



                    date)                         Complaint: unknown)  



                                                  Abdominal Pain           

 

           (unknown)  (no       (unknown)  (unknown)  Clinical  (units    (unkno

wn)



                    date)                         Impression: unknown)  

 

           (unknown)  (no       (unknown)  (unknown)  Course    (units    (unkno

wn)



                    date)                                   unknown)  

 

           (unknown)  (no       (unknown)  (unknown)  : 2000  (units   

 (unknown)



                    date)                         Acct:[id number] unknown)  

 

           (unknown)  (no       (unknown)  (unknown)  Date of   (units    (unkno

wn)



                    date)                         Service:  unknown)  



                                                  23            

 

           (unknown)  (no       (unknown)  (unknown)  Departure  (units    (unkn

own)



                    date)                                   unknown)  

 

           (unknown)  (no       (unknown)  (unknown)  Discharge Plan  (units    

(unknown)



                    date)                                   unknown)  

 

           (unknown)  (no       (unknown)  (unknown)  ER Physician:  (units    (

unknown)



                    date)                         Paz Garcia unknown)  



                                                  D.O.                

 

           (unknown)  (no       (unknown)  (unknown)  Emergency  (units    (unkn

own)



                    date)                         Report    unknown)  

 

           (unknown)  (no       (unknown)  (unknown)  Esterase  (units    (unkno

wn)



                    date)                                   unknown)  

 

           (unknown)  (no       (unknown)  (unknown)  Exam      (units    (unkno

wn)



                    date)                                   unknown)  

 

           (unknown)  (no       (unknown)  (unknown)  General   (units    (unkno

wn)



                    date)                                   unknown)  

 

           (unknown)  (no       (unknown)  (unknown)  HPI - Abdominal  (units   

 (unknown)



                    date)                         Pain      unknown)  

 

           (unknown)  (no       (unknown)  (unknown)  HPI narrative:  (units    

(unknown)



                    date)                                   unknown)  

 

           (unknown)  (no       (unknown)  (unknown)  History of  (units    (unk

nown)



                    date)                         Present Illness unknown)  

 

           (unknown)  (no       (unknown)  (unknown)  Initial Vital  (units    (

unknown)



                    date)                         Signs     unknown)  

 

           (unknown)  (no       (unknown)  (unknown)  Initial Vital  (units    (

unknown)



                    date)                         Signs:    unknown)  

 

           (unknown)  (no       (unknown)  (unknown)  Instructions:  (units    (

unknown)



                    date)                         DI for Abdominal unknown)  



                                                  Pain-Adult           

 

           (unknown)  (no       (unknown)  (unknown)  [facility]  (units   

 (unknown)



                    date)                         [address] [place] [address] Street unknown)  



                                                  [place]           



                                                  [id number]               

 

           (unknown)  (no       (unknown)  (unknown)  Lab Data  (units    (unkno

wn)



                    date)                                   unknown)  

 

           (unknown)  (no       (unknown)  (unknown)  MDM - Abdominal  (units   

 (unknown)



                    date)                         Pain      unknown)  

 

           (unknown)  (no       (unknown)  (unknown)  Mode of   (units    (unkno

wn)



                    date)                         arrival:  unknown)  



                                                  Ambulatory           

 

           (unknown)  (no       (unknown)  (unknown)  No Known Drug  (units    (

unknown)



                    date)                         Allergies unknown)  



                                                  Allergy Verified           



                                                  23 04:35           

 

           (unknown)  (no       (unknown)  (unknown)  Oxygen Delivery  (units   

 (unknown)



                    date)                         Method 23 unknown)  



                                                  04:20               

 

           (unknown)  (no       (unknown)  (unknown)  Oxygen Delivery  (units   

 (unknown)



                    date)                         Method Room Air unknown)  

 

           (unknown)  (no       (unknown)  (unknown)  Patient   (units    (unkno

wn)



                    date)                         Disposition: unknown)  



                                                  Home                

 

           (unknown)  (no       (unknown)  (unknown)  Patient History  (units   

 (unknown)



                    date)                                   unknown)  

 

           (unknown)  (no       (unknown)  (unknown)  Patient is a  (units    (u

nknown)



                    date)                         healthy   unknown)  



                                                  22-year-old           



                                                  female who woke           



                                                  up at 2:45 a.m.           



                                                  within can           

 

           (unknown)  (no       (unknown)  (unknown)  Patient:  (units    (unkno

wn)



                    date)                         Sean Sung unknown)  



                                                  MR#:            

 

           (unknown)  (no       (unknown)  (unknown)  Point of Care  (units    (

unknown)



                    date)                         Testing   unknown)  

 

           (unknown)  (no       (unknown)  (unknown)  Point of care  (units    (

unknown)



                    date)                         testing:  unknown)  

 

           (unknown)  (no       (unknown)  (unknown)  Pregnancy Test  (units    

(unknown)



                    date)                         Results Negative unknown)  

 

           (unknown)  (no       (unknown)  (unknown)  Provider,Lucila  (units   

 (unknown)



                    date)                         y PRAKASH [Primary unknown)  



                                                  Care Provider]           

 

           (unknown)  (no       (unknown)  (unknown)  Pulse Oximetry  (units    

(unknown)



                    date)                         100 23 unknown)  



                                                  04:20               

 

           (unknown)  (no       (unknown)  (unknown)  Pulse Oximetry  (units    

(unknown)



                    date)                         100       unknown)  

 

           (unknown)  (no       (unknown)  (unknown)  Pulse Rate 68  (units    (

unknown)



                    date)                         23 04:20 unknown)  

 

           (unknown)  (no       (unknown)  (unknown)  Pulse Rate 68  (units    (

unknown)



                    date)                                   unknown)  

 

           (unknown)  (no       (unknown)  (unknown) ROS        (units    (unkno

wn)



                    date)                         Unobtainable: unknown)  



                                                  All systems           



                                                  reviewed + are           



                                                  unremarkable           



                                                  except as noted           



                                                  in HPI              

 

           (unknown)  (no       (unknown)  (unknown)  Referrals:  (units    (unk

nown)



                    date)                                   unknown)  

 

           (unknown)  (no       (unknown)  (unknown)  Related Data  (units    (u

nknown)



                    date)                                   unknown)  

 

           (unknown)  (no       (unknown)  (unknown)  Respiratory  (units    (un

known)



                    date)                         Rate 18 23 unknown)  



                                                  04:20               

 

           (unknown)  (no       (unknown)  (unknown)  Respiratory  (units    (un

known)



                    date)                         Rate 18   unknown)  

 

           (unknown)  (no       (unknown)  (unknown)  Review of  (units    (unkn

own)



                    date)                         Systems   unknown)  

 

           (unknown)  (no       (unknown)  (unknown)  Signed By:  (units    (unk

nown)



                    date)                                   unknown)  

 

           (unknown)  (no       (unknown)  (unknown)  Smoking Status:  (units   

 (unknown)



                    date)                         Never smoker unknown)  

 

           (unknown)  (no       (unknown)  (unknown)  Social History  (units    

(unknown)



                    date)                         (Reviewed unknown)  



                                                  23 @ 05:19           



                                                  by Paz Garcia DO)           

 

           (unknown)  (no       (unknown)  (unknown)  Source: patient  (units   

 (unknown)



                    date)                                   unknown)  

 

           (unknown)  (no       (unknown)  (unknown)  Stand Alone  (units    (un

known)



                    date)                         Forms: Patient unknown)  



                                                  Portal/API           

 

           (unknown)  (no       (unknown)  (unknown)  Stated    (units    (unkno

wn)



                    date)                         Complaint: ABD unknown)  



                                                  PAIN                

 

           (unknown)  (no       (unknown)  (unknown)  Substance Use  (units    (

unknown)



                    date)                         Type: does not unknown)  



                                                  use                 

 

           (unknown)  (no       (unknown)  (unknown)  Temperature  (units    (un

known)



                    date)                         98.7 F 23 unknown)  



                                                  04:20               

 

           (unknown)  (no       (unknown)  (unknown)  Temperature  (units    (un

known)



                    date)                         98.7 F    unknown)  

 

           (unknown)  (no       (unknown)  (unknown)  Time Seen by  (units    (u

nknown)



                    date)                         Provider: unknown)  



                                                  23 05:06           

 

           (unknown)  (no       (unknown)  (unknown)  Urine Dip  (units    (unkn

own)



                    date)                                   unknown)  

 

           (unknown)  (no       (unknown)  (unknown)  Urine Specific  (units    

(unknown)



                    date)                         Gravity 1.020 unknown)  

 

           (unknown)  (no       (unknown)  (unknown)  Vital Signs - 8  (units   

 (unknown)



                    date)                         hr        unknown)  

 

           (unknown)  (no       (unknown)  (unknown)  Vital Signs  (units    (un

known)



                    date)                                   unknown)  

 

           (unknown)  (no       (unknown)  (unknown)  Vital signs:  (units    (u

nknown)



                    date)                                   unknown)  

 

           (unknown)  (no       (unknown)  (unknown)  abdominal pain  (units    

(unknown)



                    date)                         then please unknown)  



                                                  return to the           



                                                  emergency           



                                                  department           

 

           (unknown)  (no       (unknown)  (unknown)  alcohol intake  (units    

(unknown)



                    date)                         frequency: a few unknown)  



                                                  times a month           

 

           (unknown)  (no       (unknown)  (unknown)  and below  (units    (unkn

own)



                    date)                                   unknown)  

 

           (unknown)  (no       (unknown)  (unknown)  completely  (units    (unk

nown)



                    date)                         resolved now. unknown)  

 

           (unknown)  (no       (unknown)  (unknown)  did not vomit.  (units    

(unknown)



                    date)                         He would a bowel unknown)  



                                                  movement. She           



                                                  had a normal day           



                                                  yesterday and           

 

           (unknown)  (no       (unknown)  (unknown)  it lasted for  (units    (

unknown)



                    date)                         30 minutes. unknown)  



                                                  During the time           



                                                  she felt            



                                                  nauseous and           



                                                  short of. She           

 

           (unknown)  (no       (unknown)  (unknown)  lower abdominal  (units   

 (unknown)



                    date)                         pain all across unknown)  



                                                  her lower           



                                                  abdomen. She           



                                                  took 600 mg of           



                                                  ibuprofen           

 

           (unknown)  (no       (unknown)  (unknown)  overall   (units    (unkno

wn)



                    date)                         reassuring. unknown)  



                                                  However if you           



                                                  should have new           



                                                  worsening or           



                                                  persistent           

 

           (unknown)  (no       (unknown)  (unknown)  went to bed  (units    (un

known)



                    date)                         last night. No unknown)  



                                                  prior history of           



                                                  ovarian cyst.           



                                                  Her pain is           

 

           (unknown)  (no       (unknown)  (unknown)  worsening or  (units    (u

nknown)



                    date)                         concerning unknown)  



                                                  symptoms            









                                         Result panel 4









           (unknown)  (no       (unknown)  (unknown)  (no value)  (units    (unk

nown)



                    date)                                   unknown)  

 

           (unknown)  (no       (unknown)  (unknown)  *Continue to take  (units 

   (unknown)



                    date)                         medications as unknown)  



                                                  directed            

 

           (unknown)  (no       (unknown)  (unknown)  *Follow up with  (units   

 (unknown)



                    date)                         your primary care unknown)  



                                                  provider in 2-3           



                                                  days or call           



                                                  [phone]           

 

           (unknown)  (no       (unknown)  (unknown)  *Return to ER if  (units  

  (unknown)



                    date)                         you should have unknown)  



                                                  increasing pain           



                                                  nausea vomiting or           



                                                  any new,            

 

           (unknown)  (no       (unknown)  (unknown)  *What to do: At  (units   

 (unknown)



                    date)                         this time unknown)  



                                                  abdominal pain           



                                                  that resolved           



                                                  within 30 minutes           



                                                  is                  

 

           (unknown)  (no       (unknown)  (unknown)  *You have been  (units    

(unknown)



                    date)                         diagnosed with unknown)  



                                                  abdominal pain           

 

           (unknown)  (no       (unknown)  (unknown)  23  (units    (unkno

wn)



                    date)                                   unknown)  

 

           (unknown)  (no       (unknown)  (unknown)  04:20     (units    (unkno

wn)



                    date)                                   unknown)  

 

           (unknown)  (no       (unknown)  (unknown)  81950     (units    (unkno

wn)



                    date)                                   unknown)  

 

           (unknown)  (no       (unknown)  (unknown)  ABDOMEN: Soft,  (units    

(unknown)



                    date)                         nontender. unknown)  



                                                  Normoactive bowel           



                                                  sounds all 4           



                                                  quadrants. No           

 

           (unknown)  (no       (unknown)  (unknown)  Abdominal pain  (units    

(unknown)



                    date)                                   unknown)  

 

           (unknown)  (no       (unknown)  (unknown)  Activity  (units    (unkno

wn)



                    date)                         Restrictions/Addit unknown)  



                                                  ional               



                                                  Instructions:           

 

           (unknown)  (no       (unknown)  (unknown)  Age/Sex: 22 / F  (units   

 (unknown)



                    date)                                   unknown)  

 

           (unknown)  (no       (unknown)  (unknown)  Allergies  (units    (unkn

own)



                    date)                                   unknown)  

 

           (unknown)  (no       (unknown)  (unknown)  Allergy/AdvReac  (units   

 (unknown)



                    date)                         Type Severity unknown)  



                                                  Reaction Status           



                                                  Date / Time           

 

           (unknown)  (no       (unknown)  (unknown)  Bedside Urine  (units    (

unknown)



                    date)                         Bilirubin - unknown)  



                                                  Negative            

 

           (unknown)  (no       (unknown)  (unknown)  Bedside Urine  (units    (

unknown)



                    date)                         Glucose Negative unknown)  

 

           (unknown)  (no       (unknown)  (unknown)  Bedside Urine  (units    (

unknown)



                    date)                         Ketone - Negative unknown)  

 

           (unknown)  (no       (unknown)  (unknown)  Bedside Urine  (units    (

unknown)



                    date)                         Leukocytes - unknown)  



                                                  Negative            

 

           (unknown)  (no       (unknown)  (unknown)  Bedside Urine  (units    (

unknown)



                    date)                         Nitrite - Negative unknown)  

 

           (unknown)  (no       (unknown)  (unknown)  Bedside Urine  (units    (

unknown)



                    date)                         Occult Blood unknown)  

 

           (unknown)  (no       (unknown)  (unknown)  Bedside Urine  (units    (

unknown)



                    date)                         Protein - Negative unknown)  

 

           (unknown)  (no       (unknown)  (unknown)  Bedside Urine  (units    (

unknown)



                    date)                         Urobilinogen - unknown)  



                                                  Negative            

 

           (unknown)  (no       (unknown)  (unknown)  Bedside Urine pH  (units  

  (unknown)



                    date)                         6.0       unknown)  

 

           (unknown)  (no       (unknown)  (unknown)  Blood Pressure  (units    

(unknown)



                    date)                         121/77 23 unknown)  



                                                  04:20               

 

           (unknown)  (no       (unknown)  (unknown)  Blood Pressure  (units    

(unknown)



                    date)                         121/77    unknown)  

 

           (unknown)  (no       (unknown)  (unknown)  CARDIOVASCULAR:  (units   

 (unknown)



                    date)                         Regular rate and unknown)  



                                                  rhythm without           



                                                  murmurs, rubs or           



                                                  gallops.            

 

           (unknown)  (no       (unknown)  (unknown)  Chief Complaint:  (units  

  (unknown)



                    date)                         Abdominal Pain unknown)  

 

           (unknown)  (no       (unknown)  (unknown)  Clinical  (units    (unkno

wn)



                    date)                         Impression: unknown)  

 

           (unknown)  (no       (unknown)  (unknown)  Course    (units    (unkno

wn)



                    date)                                   unknown)  

 

           (unknown)  (no       (unknown)  (unknown)  : 2000  (units   

 (unknown)



                    date)                         Acct:[id number] unknown)  

 

           (unknown)  (no       (unknown)  (unknown)  Date of Service:  (units  

  (unknown)



                    date)                         23  unknown)  

 

           (unknown)  (no       (unknown)  (unknown)  Departure  (units    (unkn

own)



                    date)                                   unknown)  

 

           (unknown)  (no       (unknown)  (unknown)  Discharge Plan  (units    

(unknown)



                    date)                                   unknown)  

 

           (unknown)  (no       (unknown)  (unknown)  ER Physician:  (units    (

unknown)



                    date)                         Paz Garcia unknown)  



                                                  D.O.                

 

           (unknown)  (no       (unknown)  (unknown)  EXTREMITIES:  (units    (u

nknown)



                    date)                         Normal range of unknown)  



                                                  motion, no           



                                                  clubbing or edema.           



                                                  Neurovascularly           

 

           (unknown)  (no       (unknown)  (unknown)  Emergency Report  (units  

  (unknown)



                    date)                                   unknown)  

 

           (unknown)  (no       (unknown)  (unknown)  Esterase  (units    (unkno

wn)



                    date)                                   unknown)  

 

           (unknown)  (no       (unknown)  (unknown)  Exam      (units    (unkno

wn)



                    date)                                   unknown)  

 

           (unknown)  (no       (unknown)  (unknown)  GENERAL: Alert  (units    

(unknown)



                    date)                         well-appearing unknown)  



                                                  22-year-old female           



                                                  and in no acute           



                                                  distress.           

 

           (unknown)  (no       (unknown)  (unknown)  General   (units    (unkno

wn)



                    date)                                   unknown)  

 

           (unknown)  (no       (unknown)  (unknown)  HEENT: Head  (units    (un

known)



                    date)                         atraumatic,EOMI, unknown)  



                                                  pupils reactive,           



                                                  face symmetric,           



                                                  moist mucous           

 

           (unknown)  (no       (unknown)  (unknown)  HPI - Abdominal  (units   

 (unknown)



                    date)                         Pain      unknown)  

 

           (unknown)  (no       (unknown)  (unknown)  HPI narrative:  (units    

(unknown)



                    date)                                   unknown)  

 

           (unknown)  (no       (unknown)  (unknown)  History of  (units    (unk

nown)



                    date)                         Present Illness unknown)  

 

           (unknown)  (no       (unknown)  (unknown)  Initial Vital  (units    (

unknown)



                    date)                         Signs     unknown)  

 

           (unknown)  (no       (unknown)  (unknown)  Initial Vital  (units    (

unknown)



                    date)                         Signs:    unknown)  

 

           (unknown)  (no       (unknown)  (unknown)  Instructions: DI  (units  

  (unknown)



                    date)                         for Abdominal unknown)  



                                                  Pain-Adult           

 

           (unknown)  (no       (unknown)  (unknown)  [facility]  (units   

 (unknown)



                    date)                         [address] unknown)  



                                                  [place]           



                                                  [id number]               

 

           (unknown)  (no       (unknown)  (unknown)  Lab Data  (units    (unkno

wn)



                    date)                                   unknown)  

 

           (unknown)  (no       (unknown)  (unknown)  MDM - Abdominal  (units   

 (unknown)



                    date)                         Pain      unknown)  

 

           (unknown)  (no       (unknown)  (unknown)  MDM Narrative  (units    (

unknown)



                    date)                                   unknown)  

 

           (unknown)  (no       (unknown)  (unknown)  Medical decision  (units  

  (unknown)



                    date)                         making narrative: unknown)  

 

           (unknown)  (no       (unknown)  (unknown)  Mode of arrival:  (units  

  (unknown)



                    date)                         Ambulatory unknown)  

 

           (unknown)  (no       (unknown)  (unknown)  NEUROLOGICAL:  (units    (

unknown)



                    date)                         Alert and oriented unknown)  



                                                  x4.                 

 

           (unknown)  (no       (unknown)  (unknown)  No Known Drug  (units    (

unknown)



                    date)                         Allergies Allergy unknown)  



                                                  Verified 23           



                                                  04:35               

 

           (unknown)  (no       (unknown)  (unknown)  Oxygen Delivery  (units   

 (unknown)



                    date)                         Method 23 unknown)  



                                                  04:20               

 

           (unknown)  (no       (unknown)  (unknown)  Oxygen Delivery  (units   

 (unknown)



                    date)                         Method Room Air unknown)  

 

           (unknown)  (no       (unknown)  (unknown)  Patient   (units    (unkno

wn)



                    date)                         Disposition: Home unknown)  

 

           (unknown)  (no       (unknown)  (unknown)  Patient History  (units   

 (unknown)



                    date)                                   unknown)  

 

           (unknown)  (no       (unknown)  (unknown)  Patient is a  (units    (u

nknown)



                    date)                         healthy   unknown)  



                                                  22-year-old female           



                                                  who woke up at           



                                                  2:45 a.m. within           



                                                  can                 

 

           (unknown)  (no       (unknown)  (unknown)  Patient is a  (units    (u

nknown)



                    date)                         healthy   unknown)  



                                                  well-appearing           



                                                  22-year-old female           



                                                  presenting today           



                                                  with                

 

           (unknown)  (no       (unknown)  (unknown)  Patient:  (units    (unkno

wn)



                    date)                         Sean Sung MR#: unknown)  



                                                                 

 

           (unknown)  (no       (unknown)  (unknown)  Point of Care  (units    (

unknown)



                    date)                         Testing   unknown)  

 

           (unknown)  (no       (unknown)  (unknown)  Point of care  (units    (

unknown)



                    date)                         testing:  unknown)  

 

           (unknown)  (no       (unknown)  (unknown)  Pregnancy Test  (units    

(unknown)



                    date)                         Results Negative unknown)  

 

           (unknown)  (no       (unknown)  (unknown)  Provider,Min  (units  

  (unknown)



                    date)                         PRAKASH [Primary Care unknown)  



                                                  Provider]           

 

           (unknown)  (no       (unknown)  (unknown)  Pulse Oximetry  (units    

(unknown)



                    date)                         100 23 04:20 unknown)  

 

           (unknown)  (no       (unknown)  (unknown)  Pulse Oximetry  (units    

(unknown)



                    date)                         100       unknown)  

 

           (unknown)  (no       (unknown)  (unknown)  Pulse Rate 68  (units    (

unknown)



                    date)                         23 04:20 unknown)  

 

           (unknown)  (no       (unknown)  (unknown)  Pulse Rate 68  (units    (

unknown)



                    date)                                   unknown)  

 

           (unknown)  (no       (unknown)  (unknown)  RESPIRATORY:  (units    (u

nknown)



                    date)                         Breath sounds unknown)  



                                                  equal bilaterally,           



                                                  no wheezes rales           



                                                  or rhonchi.           

 

           (unknown)  (no       (unknown)  (unknown) ROS Unobtainable:  (units  

  (unknown)



                    date)                         All systems unknown)  



                                                  reviewed + are           



                                                  unremarkable           



                                                  except as noted in           



                                                  HPI                 

 

           (unknown)  (no       (unknown)  (unknown)  Referrals:  (units    (unk

nown)



                    date)                                   unknown)  

 

           (unknown)  (no       (unknown)  (unknown)  Related Data  (units    (u

nknown)



                    date)                                   unknown)  

 

           (unknown)  (no       (unknown)  (unknown)  Respiratory Rate  (units  

  (unknown)



                    date)                         18 23 04:20 unknown)  

 

           (unknown)  (no       (unknown)  (unknown)  Respiratory Rate  (units  

  (unknown)



                    date)                         18        unknown)  

 

           (unknown)  (no       (unknown)  (unknown)  Review of Systems  (units 

   (unknown)



                    date)                                   unknown)  

 

           (unknown)  (no       (unknown)  (unknown)  SKIN: Warm, dry,  (units  

  (unknown)



                    date)                         no laceration, no unknown)  



                                                  petechiae, no           



                                                  rashes or lesions.           

 

           (unknown)  (no       (unknown)  (unknown)  She is no  (units    (unkn

own)



                    date)                         recurrence of unknown)  



                                                  pain. She was           



                                                  previously within           



                                                  normal limits.           

 

           (unknown)  (no       (unknown)  (unknown)  Signed By:  (units    (unk

nown)



                    date)                                   unknown)  

 

           (unknown)  (no       (unknown)  (unknown)  Smoking Status:  (units   

 (unknown)



                    date)                         Never smoker unknown)  

 

           (unknown)  (no       (unknown)  (unknown)  Social History  (units    

(unknown)



                    date)                         (Reviewed 23 unknown)  



                                                  @ 05:19 by Paz Garcia DO)           

 

           (unknown)  (no       (unknown)  (unknown)  Source: patient  (units   

 (unknown)



                    date)                                   unknown)  

 

           (unknown)  (no       (unknown)  (unknown)  Stand Alone  (units    (un

known)



                    date)                         Forms: Patient unknown)  



                                                  Portal/API, Work           



                                                  Release Note           

 

           (unknown)  (no       (unknown)  (unknown)  Stated Complaint:  (units 

   (unknown)



                    date)                         ABD PAIN  unknown)  

 

           (unknown)  (no       (unknown)  (unknown)  Substance Use  (units    (

unknown)



                    date)                         Type: does not use unknown)  

 

           (unknown)  (no       (unknown)  (unknown)  Temperature 98.7  (units  

  (unknown)



                    date)                         F 23 04:20 unknown)  

 

           (unknown)  (no       (unknown)  (unknown)  Temperature 98.7  (units  

  (unknown)



                    date)                         F         unknown)  

 

           (unknown)  (no       (unknown)  (unknown)  Time Seen by  (units    (u

nknown)



                    date)                         Provider: 23 unknown)  



                                                  05:06               

 

           (unknown)  (no       (unknown)  (unknown)  Urinalysis and  (units    

(unknown)



                    date)                         pregnancy are unknown)  



                                                  negative.           



                                                  Differential           



                                                  diagnosis includes           



                                                  ovarian             

 

           (unknown)  (no       (unknown)  (unknown)  Urine Dip  (units    (unkn

own)



                    date)                                   unknown)  

 

           (unknown)  (no       (unknown)  (unknown)  Urine Specific  (units    

(unknown)



                    date)                         Gravity 1.020 unknown)  

 

           (unknown)  (no       (unknown)  (unknown)  Vital Signs - 8  (units   

 (unknown)



                    date)                         hr        unknown)  

 

           (unknown)  (no       (unknown)  (unknown)  Vital Signs  (units    (un

known)



                    date)                                   unknown)  

 

           (unknown)  (no       (unknown)  (unknown)  Vital signs:  (units    (u

nknown)



                    date)                                   unknown)  

 

           (unknown)  (no       (unknown)  (unknown)  abdominal pain  (units    

(unknown)



                    date)                         then please return unknown)  



                                                  to the emergency           



                                                  department           

 

           (unknown)  (no       (unknown)  (unknown)  alcohol intake  (units    

(unknown)



                    date)                         frequency: a few unknown)  



                                                  times a month           

 

           (unknown)  (no       (unknown)  (unknown)  and below  (units    (unkn

own)



                    date)                                   unknown)  

 

           (unknown)  (no       (unknown)  (unknown)  any chest pain no  (units 

   (unknown)



                    date)                         cough no rash no unknown)  



                                                  other symptoms.           

 

           (unknown)  (no       (unknown)  (unknown)  cyst rupture,  (units    (

unknown)



                    date)                         ectopic pregnancy, unknown)  



                                                  appendicitis.           



                                                  However I think           



                                                  all of these are           

 

           (unknown)  (no       (unknown)  (unknown)  did not vomit.  (units    

(unknown)



                    date)                         She had a normal unknown)  



                                                  day yesterday and           



                                                  went to bed last           



                                                  night. No           

 

           (unknown)  (no       (unknown)  (unknown)  guarding or  (units    (un

known)



                    date)                         rebound.  unknown)  

 

           (unknown)  (no       (unknown)  (unknown)  imaging needed.  (units   

 (unknown)



                    date)                                   unknown)  

 

           (unknown)  (no       (unknown)  (unknown)  intact    (units    (unkno

wn)



                    date)                                   unknown)  

 

           (unknown)  (no       (unknown)  (unknown)  it lasted for 30  (units  

  (unknown)



                    date)                         minutes. During unknown)  



                                                  the time she felt           



                                                  nauseous and short           



                                                  of. She             

 

           (unknown)  (no       (unknown)  (unknown)  lower abdominal  (units   

 (unknown)



                    date)                         pain all across unknown)  



                                                  her lower abdomen.           



                                                  She took 600 mg of           



                                                  ibuprofen           

 

           (unknown)  (no       (unknown)  (unknown)  membranes  (units    (unkn

own)



                    date)                                   unknown)  

 

           (unknown)  (no       (unknown)  (unknown)  overall   (units    (unkno

wn)



                    date)                         reassuring. unknown)  



                                                  However if you           



                                                  should have new           



                                                  worsening or           



                                                  persistent           

 

           (unknown)  (no       (unknown)  (unknown)  patient and  (units    (un

known)



                    date)                         partner. At this unknown)  



                                                  time I feel that           



                                                  there is no           



                                                  further testing or           

 

           (unknown)  (no       (unknown)  (unknown)  prior history of  (units  

  (unknown)



                    date)                         ovarian cyst. Her unknown)  



                                                  pain is completely           



                                                  resolved now. She           



                                                  denies              

 

           (unknown)  (no       (unknown)  (unknown)  relatively  (units    (unk

nown)



                    date)                         unlikely with how unknown)  



                                                  quickly symptoms           



                                                  resolved. I           



                                                  discussed this           



                                                  with                

 

           (unknown)  (no       (unknown)  (unknown)  sudden onset of  (units   

 (unknown)



                    date)                         severe abdominal unknown)  



                                                  pain lasting 30           



                                                  minutes resolving           



                                                  with Motrin.           

 

           (unknown)  (no       (unknown)  (unknown)  worsening or  (units    (u

nknown)



                    date)                         concerning unknown)  



                                                  symptoms            









                                         Result panel 5









           (unknown)  (no       (unknown)  (unknown)  (no value)  (units    (unk

nown)



                    date)                                   unknown)  

 

           (unknown)  (no       (unknown)  (unknown)  *Continue to take  (units 

   (unknown)



                    date)                         medications as unknown)  



                                                  directed            

 

           (unknown)  (no       (unknown)  (unknown)  *Follow up with  (units   

 (unknown)



                    date)                         your primary care unknown)  



                                                  provider in 2-3           



                                                  days or call           



                                                  [phone]           

 

           (unknown)  (no       (unknown)  (unknown)  *Return to ER if  (units  

  (unknown)



                    date)                         you should have unknown)  



                                                  increasing pain           



                                                  nausea vomiting or           



                                                  any new,            

 

           (unknown)  (no       (unknown)  (unknown)  *What to do: At  (units   

 (unknown)



                    date)                         this time unknown)  



                                                  abdominal pain           



                                                  that resolved           



                                                  within 30 minutes           



                                                  is                  

 

           (unknown)  (no       (unknown)  (unknown)  *You have been  (units    

(unknown)



                    date)                         diagnosed with unknown)  



                                                  abdominal pain           

 

           (unknown)  (no       (unknown)  (unknown)  23  (units    (unkno

wn)



                    date)                                   unknown)  

 

           (unknown)  (no       (unknown)  (unknown)  04:20     (units    (unkno

wn)



                    date)                                   unknown)  

 

           (unknown)  (no       (unknown)  (unknown)  05043     (units    (unkno

wn)



                    date)                                   unknown)  

 

           (unknown)  (no       (unknown)  (unknown)  ABDOMEN: Soft,  (units    

(unknown)



                    date)                         nontender. unknown)  



                                                  Normoactive bowel           



                                                  sounds all 4           



                                                  quadrants. No           

 

           (unknown)  (no       (unknown)  (unknown)  Abdominal pain  (units    

(unknown)



                    date)                                   unknown)  

 

           (unknown)  (no       (unknown)  (unknown)  Activity  (units    (unkno

wn)



                    date)                         Restrictions/Addit unknown)  



                                                  ional               



                                                  Instructions:           

 

           (unknown)  (no       (unknown)  (unknown)  Age/Sex: 22 / F  (units   

 (unknown)



                    date)                                   unknown)  

 

           (unknown)  (no       (unknown)  (unknown)  Allergies  (units    (unkn

own)



                    date)                                   unknown)  

 

           (unknown)  (no       (unknown)  (unknown)  Allergy/AdvReac  (units   

 (unknown)



                    date)                         Type Severity unknown)  



                                                  Reaction Status           



                                                  Date / Time           

 

           (unknown)  (no       (unknown)  (unknown)  Bedside Urine  (units    (

unknown)



                    date)                         Bilirubin - unknown)  



                                                  Negative            

 

           (unknown)  (no       (unknown)  (unknown)  Bedside Urine  (units    (

unknown)



                    date)                         Glucose Negative unknown)  

 

           (unknown)  (no       (unknown)  (unknown)  Bedside Urine  (units    (

unknown)



                    date)                         Ketone - Negative unknown)  

 

           (unknown)  (no       (unknown)  (unknown)  Bedside Urine  (units    (

unknown)



                    date)                         Leukocytes - unknown)  



                                                  Negative            

 

           (unknown)  (no       (unknown)  (unknown)  Bedside Urine  (units    (

unknown)



                    date)                         Nitrite - Negative unknown)  

 

           (unknown)  (no       (unknown)  (unknown)  Bedside Urine  (units    (

unknown)



                    date)                         Occult Blood unknown)  

 

           (unknown)  (no       (unknown)  (unknown)  Bedside Urine  (units    (

unknown)



                    date)                         Protein - Negative unknown)  

 

           (unknown)  (no       (unknown)  (unknown)  Bedside Urine  (units    (

unknown)



                    date)                         Urobilinogen - unknown)  



                                                  Negative            

 

           (unknown)  (no       (unknown)  (unknown)  Bedside Urine pH  (units  

  (unknown)



                    date)                         6.0       unknown)  

 

           (unknown)  (no       (unknown)  (unknown)  Blood Pressure  (units    

(unknown)



                    date)                         121/77 23 unknown)  



                                                  04:20               

 

           (unknown)  (no       (unknown)  (unknown)  Blood Pressure  (units    

(unknown)



                    date)                             unknown)  

 

           (unknown)  (no       (unknown)  (unknown)  CARDIOVASCULAR:  (units   

 (unknown)



                    date)                         Regular rate and unknown)  



                                                  rhythm without           



                                                  murmurs, rubs or           



                                                  gallops.            

 

           (unknown)  (no       (unknown)  (unknown)  Chief Complaint:  (units  

  (unknown)



                    date)                         Abdominal Pain unknown)  

 

           (unknown)  (no       (unknown)  (unknown)  Clinical  (units    (unkno

wn)



                    date)                         Impression: unknown)  

 

           (unknown)  (no       (unknown)  (unknown)  Course    (units    (unkno

wn)



                    date)                                   unknown)  

 

           (unknown)  (no       (unknown)  (unknown)  : 2000  (units   

 (unknown)



                    date)                         Acct:[id number] unknown)  

 

           (unknown)  (no       (unknown)  (unknown)  Date of Service:  (units  

  (unknown)



                    date)                         23  unknown)  

 

           (unknown)  (no       (unknown)  (unknown)  Departure  (units    (unkn

own)



                    date)                                   unknown)  

 

           (unknown)  (no       (unknown)  (unknown)  Discharge Plan  (units    

(unknown)



                    date)                                   unknown)  

 

           (unknown)  (no       (unknown)  (unknown)  ER Physician:  (units    (

unknown)



                    date)                         Paz Garcia unknown)  



                                                  D.O.                

 

           (unknown)  (no       (unknown)  (unknown)  EXTREMITIES:  (units    (u

nknown)



                    date)                         Normal range of unknown)  



                                                  motion, no           



                                                  clubbing or edema.           



                                                  Neurovascularly           

 

           (unknown)  (no       (unknown)  (unknown)  Emergency Report  (units  

  (unknown)



                    date)                                   unknown)  

 

           (unknown)  (no       (unknown)  (unknown)  Esterase  (units    (unkno

wn)



                    date)                                   unknown)  

 

           (unknown)  (no       (unknown)  (unknown)  Exam      (units    (unkno

wn)



                    date)                                   unknown)  

 

           (unknown)  (no       (unknown)  (unknown)  GENERAL: Alert  (units    

(unknown)



                    date)                         well-appearing unknown)  



                                                  22-year-old female           



                                                  and in no acute           



                                                  distress.           

 

           (unknown)  (no       (unknown)  (unknown)  General   (units    (unkno

wn)



                    date)                                   unknown)  

 

           (unknown)  (no       (unknown)  (unknown)  HEENT: Head  (units    (un

known)



                    date)                         atraumatic,EOMI, unknown)  



                                                  pupils reactive,           



                                                  face symmetric,           



                                                  moist mucous           

 

           (unknown)  (no       (unknown)  (unknown)  HPI - Abdominal  (units   

 (unknown)



                    date)                         Pain      unknown)  

 

           (unknown)  (no       (unknown)  (unknown)  HPI narrative:  (units    

(unknown)



                    date)                                   unknown)  

 

           (unknown)  (no       (unknown)  (unknown)  History of  (units    (unk

nown)



                    date)                         Present Illness unknown)  

 

           (unknown)  (no       (unknown)  (unknown)  Initial Vital  (units    (

unknown)



                    date)                         Signs     unknown)  

 

           (unknown)  (no       (unknown)  (unknown)  Initial Vital  (units    (

unknown)



                    date)                         Signs:    unknown)  

 

           (unknown)  (no       (unknown)  (unknown)  Instructions: DI  (units  

  (unknown)



                    date)                         for Abdominal unknown)  



                                                  Pain-Adult           

 

           (unknown)  (no       (unknown)  (unknown)  [facility]  (units   

 (unknown)



                    date)                         [address] unknown)  



                                                  [place]           



                                                  [id number]               

 

           (unknown)  (no       (unknown)  (unknown)  Lab Data  (units    (unkno

wn)



                    date)                                   unknown)  

 

           (unknown)  (no       (unknown)  (unknown)  MDM - Abdominal  (units   

 (unknown)



                    date)                         Pain      unknown)  

 

           (unknown)  (no       (unknown)  (unknown)  MDM Narrative  (units    (

unknown)



                    date)                                   unknown)  

 

           (unknown)  (no       (unknown)  (unknown)  Medical decision  (units  

  (unknown)



                    date)                         making narrative: unknown)  

 

           (unknown)  (no       (unknown)  (unknown)  Mode of arrival:  (units  

  (unknown)



                    date)                         Ambulatory unknown)  

 

           (unknown)  (no       (unknown)  (unknown)  NEUROLOGICAL:  (units    (

unknown)



                    date)                         Alert and oriented unknown)  



                                                  x4.                 

 

           (unknown)  (no       (unknown)  (unknown)  No Known Drug  (units    (

unknown)



                    date)                         Allergies Allergy unknown)  



                                                  Verified 23           



                                                  04:35               

 

           (unknown)  (no       (unknown)  (unknown)  Oxygen Delivery  (units   

 (unknown)



                    date)                         Method 23 unknown)  



                                                  04:20               

 

           (unknown)  (no       (unknown)  (unknown)  Oxygen Delivery  (units   

 (unknown)



                    date)                         Method Room Air unknown)  

 

           (unknown)  (no       (unknown)  (unknown)  Patient   (units    (unkno

wn)



                    date)                         Disposition: Home unknown)  

 

           (unknown)  (no       (unknown)  (unknown)  Patient History  (units   

 (unknown)



                    date)                                   unknown)  

 

           (unknown)  (no       (unknown)  (unknown)  Patient is a  (units    (u

nknown)



                    date)                         healthy   unknown)  



                                                  22-year-old female           



                                                  who woke up at           



                                                  2:45 a.m. within           



                                                  can                 

 

           (unknown)  (no       (unknown)  (unknown)  Patient is a  (units    (u

nknown)



                    date)                         healthy   unknown)  



                                                  well-appearing           



                                                  22-year-old female           



                                                  presenting today           



                                                  with                

 

           (unknown)  (no       (unknown)  (unknown)  Patient:  (units    (unkno

wn)



                    date)                         Sean Sung MR#: unknown)  



                                                                 

 

           (unknown)  (no       (unknown)  (unknown)  Point of Care  (units    (

unknown)



                    date)                         Testing   unknown)  

 

           (unknown)  (no       (unknown)  (unknown)  Point of care  (units    (

unknown)



                    date)                         testing:  unknown)  

 

           (unknown)  (no       (unknown)  (unknown)  Pregnancy Test  (units    

(unknown)



                    date)                         Results Negative unknown)  

 

           (unknown)  (no       (unknown)  (unknown)  Provider,Min  (units  

  (unknown)



                    date)                         PRAKASH [Primary Care unknown)  



                                                  Provider]           

 

           (unknown)  (no       (unknown)  (unknown)  Pulse Oximetry  (units    

(unknown)



                    date)                         100 23 04:20 unknown)  

 

           (unknown)  (no       (unknown)  (unknown)  Pulse Oximetry  (units    

(unknown)



                    date)                         100       unknown)  

 

           (unknown)  (no       (unknown)  (unknown)  Pulse Rate 68  (units    (

unknown)



                    date)                         23 04:20 unknown)  

 

           (unknown)  (no       (unknown)  (unknown)  Pulse Rate 68  (units    (

unknown)



                    date)                                   unknown)  

 

           (unknown)  (no       (unknown)  (unknown)  RESPIRATORY:  (units    (u

nknown)



                    date)                         Breath sounds unknown)  



                                                  equal bilaterally,           



                                                  no wheezes rales           



                                                  or rhonchi.           

 

           (unknown)  (no       (unknown)  (unknown) ROS Unobtainable:  (units  

  (unknown)



                    date)                         All systems unknown)  



                                                  reviewed + are           



                                                  unremarkable           



                                                  except as noted in           



                                                  HPI                 

 

           (unknown)  (no       (unknown)  (unknown)  Referrals:  (units    (unk

nown)



                    date)                                   unknown)  

 

           (unknown)  (no       (unknown)  (unknown)  Related Data  (units    (u

nknown)



                    date)                                   unknown)  

 

           (unknown)  (no       (unknown)  (unknown)  Respiratory Rate  (units  

  (unknown)



                    date)                         18 23 04:20 unknown)  

 

           (unknown)  (no       (unknown)  (unknown)  Respiratory Rate  (units  

  (unknown)



                    date)                         18        unknown)  

 

           (unknown)  (no       (unknown)  (unknown)  Review of Systems  (units 

   (unknown)



                    date)                                   unknown)  

 

           (unknown)  (no       (unknown)  (unknown)  SKIN: Warm, dry,  (units  

  (unknown)



                    date)                         no laceration, no unknown)  



                                                  petechiae, no           



                                                  rashes or lesions.           

 

           (unknown)  (no       (unknown)  (unknown)  She is no  (units    (unkn

own)



                    date)                         recurrence of unknown)  



                                                  pain. She was           



                                                  previously within           



                                                  normal limits.           

 

           (unknown)  (no       (unknown)  (unknown)  Signed By:  (units    (unk

nown)



                    date)                                   unknown)  

 

           (unknown)  (no       (unknown)  (unknown)  Smoking Status:  (units   

 (unknown)



                    date)                         Never smoker unknown)  

 

           (unknown)  (no       (unknown)  (unknown)  Social History  (units    

(unknown)



                    date)                         (Reviewed 23 unknown)  



                                                  @ 05:19 by Paz Garcia DO)           

 

           (unknown)  (no       (unknown)  (unknown)  Source: patient  (units   

 (unknown)



                    date)                                   unknown)  

 

           (unknown)  (no       (unknown)  (unknown)  Stand Alone  (units    (un

known)



                    date)                         Forms: Patient unknown)  



                                                  Portal/API, Work           



                                                  Release Note           

 

           (unknown)  (no       (unknown)  (unknown)  Stated Complaint:  (units 

   (unknown)



                    date)                         ABD PAIN  unknown)  

 

           (unknown)  (no       (unknown)  (unknown)  Substance Use  (units    (

unknown)



                    date)                         Type: does not use unknown)  

 

           (unknown)  (no       (unknown)  (unknown)  Temperature 98.7  (units  

  (unknown)



                    date)                         F 23 04:20 unknown)  

 

           (unknown)  (no       (unknown)  (unknown)  Temperature 98.7  (units  

  (unknown)



                    date)                         F         unknown)  

 

           (unknown)  (no       (unknown)  (unknown)  Time Seen by  (units    (u

nknown)



                    date)                         Provider: 23 unknown)  



                                                  05:06               

 

           (unknown)  (no       (unknown)  (unknown)  Urinalysis and  (units    

(unknown)



                    date)                         pregnancy are unknown)  



                                                  negative.           



                                                  Differential           



                                                  diagnosis includes           



                                                  ovarian             

 

           (unknown)  (no       (unknown)  (unknown)  Urine Dip  (units    (unkn

own)



                    date)                                   unknown)  

 

           (unknown)  (no       (unknown)  (unknown)  Urine Specific  (units    

(unknown)



                    date)                         Gravity 1.020 unknown)  

 

           (unknown)  (no       (unknown)  (unknown)  Vital Signs - 8  (units   

 (unknown)



                    date)                         hr        unknown)  

 

           (unknown)  (no       (unknown)  (unknown)  Vital Signs  (units    (un

known)



                    date)                                   unknown)  

 

           (unknown)  (no       (unknown)  (unknown)  Vital signs:  (units    (u

nknown)



                    date)                                   unknown)  

 

           (unknown)  (no       (unknown)  (unknown)  abdominal pain  (units    

(unknown)



                    date)                         then please return unknown)  



                                                  to the emergency           



                                                  department           

 

           (unknown)  (no       (unknown)  (unknown)  alcohol intake  (units    

(unknown)



                    date)                         frequency: a few unknown)  



                                                  times a month           

 

           (unknown)  (no       (unknown)  (unknown)  and below  (units    (unkn

own)



                    date)                                   unknown)  

 

           (unknown)  (no       (unknown)  (unknown)  any chest pain no  (units 

   (unknown)



                    date)                         cough no rash no unknown)  



                                                  other symptoms.           

 

           (unknown)  (no       (unknown)  (unknown)  cyst rupture,  (units    (

unknown)



                    date)                         ectopic pregnancy, unknown)  



                                                  appendicitis.           



                                                  However I think           



                                                  all of these are           

 

           (unknown)  (no       (unknown)  (unknown)  did not vomit.  (units    

(unknown)



                    date)                         She had a normal unknown)  



                                                  day yesterday and           



                                                  went to bed last           



                                                  night. No           

 

           (unknown)  (no       (unknown)  (unknown)  guarding or  (units    (un

known)



                    date)                         rebound.  unknown)  

 

           (unknown)  (no       (unknown)  (unknown)  imaging needed.  (units   

 (unknown)



                    date)                                   unknown)  

 

           (unknown)  (no       (unknown)  (unknown)  intact    (units    (unkno

wn)



                    date)                                   unknown)  

 

           (unknown)  (no       (unknown)  (unknown)  it lasted for 30  (units  

  (unknown)



                    date)                         minutes. During unknown)  



                                                  the time she felt           



                                                  nauseous and short           



                                                  of. She             

 

           (unknown)  (no       (unknown)  (unknown)  lower abdominal  (units   

 (unknown)



                    date)                         pain all across unknown)  



                                                  her lower abdomen.           



                                                  She took 600 mg of           



                                                  ibuprofen           

 

           (unknown)  (no       (unknown)  (unknown)  membranes  (units    (unkn

own)



                    date)                                   unknown)  

 

           (unknown)  (no       (unknown)  (unknown)  overall   (units    (unkno

wn)



                    date)                         reassuring. unknown)  



                                                  However if you           



                                                  should have new           



                                                  worsening or           



                                                  persistent           

 

           (unknown)  (no       (unknown)  (unknown)  patient and  (units    (un

known)



                    date)                         partner. At this unknown)  



                                                  time I feel that           



                                                  there is no           



                                                  further testing or           

 

           (unknown)  (no       (unknown)  (unknown)  prior history of  (units  

  (unknown)



                    date)                         ovarian cyst. Her unknown)  



                                                  pain is completely           



                                                  resolved now. She           



                                                  denies              

 

           (unknown)  (no       (unknown)  (unknown)  relatively  (units    (unk

nown)



                    date)                         unlikely with how unknown)  



                                                  quickly symptoms           



                                                  resolved. I           



                                                  discussed this           



                                                  with                

 

           (unknown)  (no       (unknown)  (unknown)  sudden onset of  (units   

 (unknown)



                    date)                         severe abdominal unknown)  



                                                  pain lasting 30           



                                                  minutes resolving           



                                                  with Motrin.           

 

           (unknown)  (no       (unknown)  (unknown)  worsening or  (units    (u

nknown)



                    date)                         concerning unknown)  



                                                  symptoms            









                                         Result panel 6









           (unknown)  (no       (unknown)  (unknown)  (no value)  (units    (unk

nown)



                    date)                                   unknown)  

 

           (unknown)  (no       (unknown)  (unknown)  <Electronically  (units   

 (unknown)



                    date)                         signed by Paz unknownYina Garcia D.O.>           

 

           (unknown)  (no       (unknown)  (unknown)  *Continue to take  (units 

   (unknown)



                    date)                         medications as unknown)  



                                                  directed            

 

           (unknown)  (no       (unknown)  (unknown)  *Follow up with  (units   

 (unknown)



                    date)                         your primary care unknown)  



                                                  provider in 2-3           



                                                  days or call           



                                                  [phone]           

 

           (unknown)  (no       (unknown)  (unknown)  *Return to ER if  (units  

  (unknown)



                    date)                         you should have unknown)  



                                                  increasing pain           



                                                  nausea vomiting or           



                                                  any new,            

 

           (unknown)  (no       (unknown)  (unknown)  *What to do: At  (units   

 (unknown)



                    date)                         this time unknown)  



                                                  abdominal pain           



                                                  that resolved           



                                                  within 30 minutes           



                                                  is                  

 

           (unknown)  (no       (unknown)  (unknown)  *You have been  (units    

(unknown)



                    date)                         diagnosed with unknown)  



                                                  abdominal pain           

 

           (unknown)  (no       (unknown)  (unknown)  23 0625  (units    (

unknown)



                    date)                                   unknown)  

 

           (unknown)  (no       (unknown)  (unknown)  23  (units    (unkno

wn)



                    date)                                   unknown)  

 

           (unknown)  (no       (unknown)  (unknown)  04:20 23  (units    

(unknown)



                    date)                                   unknown)  

 

           (unknown)  (no       (unknown)  (unknown)  05:33     (units    (unkno

wn)



                    date)                                   unknown)  

 

           (unknown)  (no       (unknown)  (unknown)  13229     (units    (unkno

wn)



                    date)                                   unknown)  

 

           (unknown)  (no       (unknown)  (unknown)  ABDOMEN: Soft,  (units    

(unknown)



                    date)                         nontender. unknown)  



                                                  Normoactive bowel           



                                                  sounds all 4           



                                                  quadrants. No           

 

           (unknown)  (no       (unknown)  (unknown)  Abdominal pain  (units    

(unknown)



                    date)                                   unknown)  

 

           (unknown)  (no       (unknown)  (unknown)  Activity  (units    (unkno

wn)



                    date)                         Restrictions/Addit unknown)  



                                                  ional               



                                                  Instructions:           

 

           (unknown)  (no       (unknown)  (unknown)  Age/Sex: 22 / F  (units   

 (unknown)



                    date)                                   unknown)  

 

           (unknown)  (no       (unknown)  (unknown)  Allergies  (units    (unkn

own)



                    date)                                   unknown)  

 

           (unknown)  (no       (unknown)  (unknown)  Allergy/AdvReac  (units   

 (unknown)



                    date)                         Type Severity unknown)  



                                                  Reaction Status           



                                                  Date / Time           

 

           (unknown)  (no       (unknown)  (unknown)  Bedside Urine  (units    (

unknown)



                    date)                         Bilirubin - unknown)  



                                                  Negative            

 

           (unknown)  (no       (unknown)  (unknown)  Bedside Urine  (units    (

unknown)



                    date)                         Glucose Negative unknown)  

 

           (unknown)  (no       (unknown)  (unknown)  Bedside Urine  (units    (

unknown)



                    date)                         Ketone - Negative unknown)  

 

           (unknown)  (no       (unknown)  (unknown)  Bedside Urine  (units    (

unknown)



                    date)                         Leukocytes - unknown)  



                                                  Negative            

 

           (unknown)  (no       (unknown)  (unknown)  Bedside Urine  (units    (

unknown)



                    date)                         Nitrite - Negative unknown)  

 

           (unknown)  (no       (unknown)  (unknown)  Bedside Urine  (units    (

unknown)



                    date)                         Occult Blood unknown)  

 

           (unknown)  (no       (unknown)  (unknown)  Bedside Urine  (units    (

unknown)



                    date)                         Protein - Negative unknown)  

 

           (unknown)  (no       (unknown)  (unknown)  Bedside Urine  (units    (

unknown)



                    date)                         Urobilinogen - unknown)  



                                                  Negative            

 

           (unknown)  (no       (unknown)  (unknown)  Bedside Urine pH  (units  

  (unknown)



                    date)                         6.0       unknown)  

 

           (unknown)  (no       (unknown)  (unknown)  Blood Pressure  (units    

(unknown)



                    date)                         121/77 23 unknown)  



                                                  04:20               

 

           (unknown)  (no       (unknown)  (unknown)  Blood Pressure  (units    

(unknown)



                    date)                          145/75 H unknown)  

 

           (unknown)  (no       (unknown)  (unknown)  CARDIOVASCULAR:  (units   

 (unknown)



                    date)                         Regular rate and unknown)  



                                                  rhythm without           



                                                  murmurs, rubs or           



                                                  gallops.            

 

           (unknown)  (no       (unknown)  (unknown)  Chief Complaint:  (units  

  (unknown)



                    date)                         Abdominal Pain unknown)  

 

           (unknown)  (no       (unknown)  (unknown)  Clinical  (units    (unkno

wn)



                    date)                         Impression: unknown)  

 

           (unknown)  (no       (unknown)  (unknown)  Course    (units    (unkno

wn)



                    date)                                   unknown)  

 

           (unknown)  (no       (unknown)  (unknown)  : 2000  (units   

 (unknown)



                    date)                         Acct:[id number] unknown)  

 

           (unknown)  (no       (unknown)  (unknown)  Date of Service:  (units  

  (unknown)



                    date)                         23  unknown)  

 

           (unknown)  (no       (unknown)  (unknown)  Departure  (units    (unkn

own)



                    date)                                   unknown)  

 

           (unknown)  (no       (unknown)  (unknown)  Discharge Plan  (units    

(unknown)



                    date)                                   unknown)  

 

           (unknown)  (no       (unknown)  (unknown)  ER Physician:  (units    (

unknown)



                    date)                         Paz Garcia unknown)  



                                                  D.O.                

 

           (unknown)  (no       (unknown)  (unknown)  EXTREMITIES:  (units    (u

nknown)



                    date)                         Normal range of unknown)  



                                                  motion, no           



                                                  clubbing or edema.           



                                                  Neurovascularly           

 

           (unknown)  (no       (unknown)  (unknown)  Emergency Report  (units  

  (unknown)



                    date)                                   unknown)  

 

           (unknown)  (no       (unknown)  (unknown)  Esterase  (units    (unkno

wn)



                    date)                                   unknown)  

 

           (unknown)  (no       (unknown)  (unknown)  Exam      (units    (unkno

wn)



                    date)                                   unknown)  

 

           (unknown)  (no       (unknown)  (unknown)  GENERAL: Alert  (units    

(unknown)



                    date)                         well-appearing unknown)  



                                                  22-year-old female           



                                                  and in no acute           



                                                  distress.           

 

           (unknown)  (no       (unknown)  (unknown)  General   (units    (unkno

wn)



                    date)                                   unknown)  

 

           (unknown)  (no       (unknown)  (unknown)  HEENT: Head  (units    (un

known)



                    date)                         atraumatic,EOMI, unknown)  



                                                  pupils reactive,           



                                                  face symmetric,           



                                                  moist mucous           

 

           (unknown)  (no       (unknown)  (unknown)  HPI - Abdominal  (units   

 (unknown)



                    date)                         Pain      unknown)  

 

           (unknown)  (no       (unknown)  (unknown)  HPI narrative:  (units    

(unknown)



                    date)                                   unknown)  

 

           (unknown)  (no       (unknown)  (unknown)  History of  (units    (unk

nown)



                    date)                         Present Illness unknown)  

 

           (unknown)  (no       (unknown)  (unknown)  Initial Vital  (units    (

unknown)



                    date)                         Signs     unknown)  

 

           (unknown)  (no       (unknown)  (unknown)  Initial Vital  (units    (

unknown)



                    date)                         Signs:    unknown)  

 

           (unknown)  (no       (unknown)  (unknown)  Instructions: DI  (units  

  (unknown)



                    date)                         for Abdominal unknown)  



                                                  Pain-Adult           

 

           (unknown)  (no       (unknown)  (unknown)  [facility]  (units   

 (unknown)



                    date)                         [address] [place] [address] unknown)  



                                                  [place]           



                                                  [id number]               

 

           (unknown)  (no       (unknown)  (unknown)  Lab Data  (units    (unkno

wn)



                    date)                                   unknown)  

 

           (unknown)  (no       (unknown)  (unknown)  MDM - Abdominal  (units   

 (unknown)



                    date)                         Pain      unknown)  

 

           (unknown)  (no       (unknown)  (unknown)  MDM Narrative  (units    (

unknown)



                    date)                                   unknown)  

 

           (unknown)  (no       (unknown)  (unknown)  Medical decision  (units  

  (unknown)



                    date)                         making narrative: unknown)  

 

           (unknown)  (no       (unknown)  (unknown)  Mode of arrival:  (units  

  (unknown)



                    date)                         Ambulatory unknown)  

 

           (unknown)  (no       (unknown)  (unknown)  NEUROLOGICAL:  (units    (

unknown)



                    date)                         Alert and oriented unknown)  



                                                  x4.                 

 

           (unknown)  (no       (unknown)  (unknown)  No Known Drug  (units    (

unknown)



                    date)                         Allergies Allergy unknown)  



                                                  Verified 23           



                                                  04:35               

 

           (unknown)  (no       (unknown)  (unknown)  Oxygen Delivery  (units   

 (unknown)



                    date)                         Method 23 unknown)  



                                                  04:20               

 

           (unknown)  (no       (unknown)  (unknown)  Oxygen Delivery  (units   

 (unknown)



                    date)                         Method Room Air unknown)  



                                                  Room Air            

 

           (unknown)  (no       (unknown)  (unknown)  Patient   (units    (unkno

wn)



                    date)                         Disposition: Home unknown)  

 

           (unknown)  (no       (unknown)  (unknown)  Patient History  (units   

 (unknown)



                    date)                                   unknown)  

 

           (unknown)  (no       (unknown)  (unknown)  Patient is a  (units    (u

nknown)



                    date)                         healthy   unknown)  



                                                  22-year-old female           



                                                  who woke up at           



                                                  2:45 a.m. within           



                                                  can                 

 

           (unknown)  (no       (unknown)  (unknown)  Patient is a  (units    (u

nknown)



                    date)                         healthy   unknown)  



                                                  well-appearing           



                                                  22-year-old female           



                                                  presenting today           



                                                  with                

 

           (unknown)  (no       (unknown)  (unknown)  Patient:  (units    (unkno

wn)



                    date)                         Sean Sung MR#: unknown)  



                                                                 

 

           (unknown)  (no       (unknown)  (unknown)  Point of Care  (units    (

unknown)



                    date)                         Testing   unknown)  

 

           (unknown)  (no       (unknown)  (unknown)  Point of care  (units    (

unknown)



                    date)                         testing:  unknown)  

 

           (unknown)  (no       (unknown)  (unknown)  Pregnancy Test  (units    

(unknown)



                    date)                         Results Negative unknown)  

 

           (unknown)  (no       (unknown)  (unknown)  Provider,Min  (units  

  (unknown)



                    date)                         PRAKASH [[facility] unknown)  



                                                  Provider]           

 

           (unknown)  (no       (unknown)  (unknown)  Pulse Oximetry  (units    

(unknown)



                    date)                         100 23 04:20 unknown)  

 

           (unknown)  (no       (unknown)  (unknown)  Pulse Oximetry  (units    

(unknown)



                    date)                         100 98    unknown)  

 

           (unknown)  (no       (unknown)  (unknown)  Pulse Rate 68  (units    (

unknown)



                    date)                         23 04:20 unknown)  

 

           (unknown)  (no       (unknown)  (unknown)  Pulse Rate 68 76  (units  

  (unknown)



                    date)                                   unknown)  

 

           (unknown)  (no       (unknown)  (unknown)  RESPIRATORY:  (units    (u

nknown)



                    date)                         Breath sounds unknown)  



                                                  equal bilaterally,           



                                                  no wheezes rales           



                                                  or rhonchi.           

 

           (unknown)  (no       (unknown)  (unknown) ROS Unobtainable:  (units  

  (unknown)



                    date)                         All systems unknown)  



                                                  reviewed + are           



                                                  unremarkable           



                                                  except as noted in           



                                                  HPI                 

 

           (unknown)  (no       (unknown)  (unknown)  Referrals:  (units    (unk

nown)



                    date)                                   unknown)  

 

           (unknown)  (no       (unknown)  (unknown)  Related Data  (units    (u

nknown)



                    date)                                   unknown)  

 

           (unknown)  (no       (unknown)  (unknown)  Respiratory Rate  (units  

  (unknown)



                    date)                         18 23 04:20 unknown)  

 

           (unknown)  (no       (unknown)  (unknown)  Respiratory Rate  (units  

  (unknown)



                    date)                         18 18     unknown)  

 

           (unknown)  (no       (unknown)  (unknown)  Review of Systems  (units 

   (unknown)



                    date)                                   unknown)  

 

           (unknown)  (no       (unknown)  (unknown)  SKIN: Warm, dry,  (units  

  (unknown)



                    date)                         no laceration, no unknown)  



                                                  petechiae, no           



                                                  rashes or lesions.           

 

           (unknown)  (no       (unknown)  (unknown)  She is no  (units    (unkn

own)



                    date)                         recurrence of unknown)  



                                                  pain. She was           



                                                  previously within           



                                                  normal limits.           

 

           (unknown)  (no       (unknown)  (unknown)  Signed By:  (units    (unk

nown)



                    date)                                   unknown)  

 

           (unknown)  (no       (unknown)  (unknown)  Smoking Status:  (units   

 (unknown)



                    date)                         Never smoker unknown)  

 

           (unknown)  (no       (unknown)  (unknown)  Social History  (units    

(unknown)



                    date)                         (Reviewed 23 unknown)  



                                                  @ 05:19 by Paz Garcia DO)           

 

           (unknown)  (no       (unknown)  (unknown)  Source: patient  (units   

 (unknown)



                    date)                                   unknown)  

 

           (unknown)  (no       (unknown)  (unknown)  Stand Alone  (units    (un

known)



                    date)                         Forms: Patient unknown)  



                                                  Portal/API, Work           



                                                  Release Note           

 

           (unknown)  (no       (unknown)  (unknown)  Stated Complaint:  (units 

   (unknown)



                    date)                         ABD PAIN  unknown)  

 

           (unknown)  (no       (unknown)  (unknown)  Substance Use  (units    (

unknown)



                    date)                         Type: does not use unknown)  

 

           (unknown)  (no       (unknown)  (unknown)  Temperature 98.7  (units  

  (unknown)



                    date)                         F 23 04:20 unknown)  

 

           (unknown)  (no       (unknown)  (unknown)  Temperature 98.7  (units  

  (unknown)



                    date)                         F         unknown)  

 

           (unknown)  (no       (unknown)  (unknown)  Time Seen by  (units    (u

nknown)



                    date)                         Provider: 23 unknown)  



                                                  05:06               

 

           (unknown)  (no       (unknown)  (unknown)  Urinalysis and  (units    

(unknown)



                    date)                         pregnancy are unknown)  



                                                  negative.           



                                                  Differential           



                                                  diagnosis includes           



                                                  ovarian             

 

           (unknown)  (no       (unknown)  (unknown)  Urine Dip  (units    (unkn

own)



                    date)                                   unknown)  

 

           (unknown)  (no       (unknown)  (unknown)  Urine Specific  (units    

(unknown)



                    date)                         Gravity 1.020 unknown)  

 

           (unknown)  (no       (unknown)  (unknown)  Vital Signs - 8  (units   

 (unknown)



                    date)                         hr        unknown)  

 

           (unknown)  (no       (unknown)  (unknown)  Vital Signs  (units    (un

known)



                    date)                                   unknown)  

 

           (unknown)  (no       (unknown)  (unknown)  Vital signs:  (units    (u

nknown)



                    date)                                   unknown)  

 

           (unknown)  (no       (unknown)  (unknown)  abdominal pain  (units    

(unknown)



                    date)                         then please return unknown)  



                                                  to the emergency           



                                                  department           

 

           (unknown)  (no       (unknown)  (unknown)  alcohol intake  (units    

(unknown)



                    date)                         frequency: a few unknown)  



                                                  times a month           

 

           (unknown)  (no       (unknown)  (unknown)  and below  (units    (unkn

own)



                    date)                                   unknown)  

 

           (unknown)  (no       (unknown)  (unknown)  any chest pain no  (units 

   (unknown)



                    date)                         cough no rash no unknown)  



                                                  other symptoms.           

 

           (unknown)  (no       (unknown)  (unknown)  cyst rupture,  (units    (

unknown)



                    date)                         ectopic pregnancy, unknown)  



                                                  appendicitis.           



                                                  However I think           



                                                  all of these are           

 

           (unknown)  (no       (unknown)  (unknown)  did not vomit.  (units    

(unknown)



                    date)                         She had a normal unknown)  



                                                  day yesterday and           



                                                  went to bed last           



                                                  night. No           

 

           (unknown)  (no       (unknown)  (unknown)  guarding or  (units    (un

known)



                    date)                         rebound.  unknown)  

 

           (unknown)  (no       (unknown)  (unknown)  imaging needed.  (units   

 (unknown)



                    date)                                   unknown)  

 

           (unknown)  (no       (unknown)  (unknown)  intact    (units    (unkno

wn)



                    date)                                   unknown)  

 

           (unknown)  (no       (unknown)  (unknown)  it lasted for 30  (units  

  (unknown)



                    date)                         minutes. During unknown)  



                                                  the time she felt           



                                                  nauseous and short           



                                                  of. She             

 

           (unknown)  (no       (unknown)  (unknown)  lower abdominal  (units   

 (unknown)



                    date)                         pain all across unknown)  



                                                  her lower abdomen.           



                                                  She took 600 mg of           



                                                  ibuprofen           

 

           (unknown)  (no       (unknown)  (unknown)  membranes  (units    (unkn

own)



                    date)                                   unknown)  

 

           (unknown)  (no       (unknown)  (unknown)  overall   (units    (unkno

wn)



                    date)                         reassuring. unknown)  



                                                  However if you           



                                                  should have new           



                                                  worsening or           



                                                  persistent           

 

           (unknown)  (no       (unknown)  (unknown)  patient and  (units    (un

known)



                    date)                         partner. At this unknown)  



                                                  time I feel that           



                                                  there is no           



                                                  further testing or           

 

           (unknown)  (no       (unknown)  (unknown)  prior history of  (units  

  (unknown)



                    date)                         ovarian cyst. Her unknown)  



                                                  pain is completely           



                                                  resolved now. She           



                                                  denies              

 

           (unknown)  (no       (unknown)  (unknown)  relatively  (units    (unk

nown)



                    date)                         unlikely with how unknown)  



                                                  quickly symptoms           



                                                  resolved. I           



                                                  discussed this           



                                                  with                

 

           (unknown)  (no       (unknown)  (unknown)  sudden onset of  (units   

 (unknown)



                    date)                         severe abdominal unknown)  



                                                  pain lasting 30           



                                                  minutes resolving           



                                                  with Motrin.           

 

           (unknown)  (no       (unknown)  (unknown)  worsening or  (units    (u

nknown)



                    date)                         concerning unknown)  



                                                  symptoms            







Social History







                     date                description         facility

 

                     2023 00:00    Never smoked tobacco ([facility]







Vital Signs







                 date            measurement     value           units

 

                 2023 00:00  BP_diastolic    75              mmHg

 

                 2023 00:00  BP_systolic     145             mmHg

 

                 2023 00:00  heart_rate      76              /min

 

                 2023 00:00  o2_saturation   98              %

 

                 2023 00:00  respiration_rate  18              /min

 

                 2023 00:00  temperature_metric  37.06           C

 

                 2023 00:00  temperature_standard  98.7            F

 

                 2023 00:00  weight_metric   86.18           kg

 

                 2023 00:00  weight_standard  189.99          lb